# Patient Record
Sex: FEMALE
[De-identification: names, ages, dates, MRNs, and addresses within clinical notes are randomized per-mention and may not be internally consistent; named-entity substitution may affect disease eponyms.]

---

## 2019-03-13 ENCOUNTER — HOSPITAL ENCOUNTER (EMERGENCY)
Dept: HOSPITAL 88 - ER | Age: 49
Discharge: HOME | End: 2019-03-13
Payer: MEDICARE

## 2019-03-13 VITALS — HEIGHT: 62 IN | BODY MASS INDEX: 42.14 KG/M2 | WEIGHT: 229 LBS

## 2019-03-13 DIAGNOSIS — I10: ICD-10-CM
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
## 2019-03-13 DIAGNOSIS — R10.33: Primary | ICD-10-CM

## 2019-03-13 DIAGNOSIS — F41.9: ICD-10-CM

## 2019-03-13 DIAGNOSIS — Z87.11: ICD-10-CM

## 2019-03-13 DIAGNOSIS — K21.9: ICD-10-CM

## 2019-03-13 DIAGNOSIS — E78.5: ICD-10-CM

## 2019-03-13 LAB
ALBUMIN SERPL-MCNC: 4.1 G/DL (ref 3.5–5)
ALBUMIN/GLOB SERPL: 1.3 {RATIO} (ref 0.8–2)
ALP SERPL-CCNC: 122 IU/L (ref 40–150)
ALT SERPL-CCNC: 31 IU/L (ref 0–55)
AMYLASE SERPL-CCNC: 38 U/L (ref 25–125)
ANION GAP SERPL CALC-SCNC: 11.3 MMOL/L (ref 8–16)
BACTERIA URNS QL MICRO: (no result) /HPF
BASOPHILS # BLD AUTO: 0.1 10*3/UL (ref 0–0.1)
BASOPHILS NFR BLD AUTO: 0.8 % (ref 0–1)
BILIRUB UR QL: NEGATIVE
BUN SERPL-MCNC: 16 MG/DL (ref 7–26)
BUN/CREAT SERPL: 19 (ref 6–25)
CALCIUM SERPL-MCNC: 9.5 MG/DL (ref 8.4–10.2)
CHLORIDE SERPL-SCNC: 94 MMOL/L (ref 98–107)
CLARITY UR: CLEAR
CO2 SERPL-SCNC: 34 MMOL/L (ref 22–29)
COLOR UR: YELLOW
DEPRECATED NEUTROPHILS # BLD AUTO: 3.1 10*3/UL (ref 2.1–6.9)
DEPRECATED RBC URNS MANUAL-ACNC: (no result) /HPF (ref 0–5)
EGFRCR SERPLBLD CKD-EPI 2021: > 60 ML/MIN (ref 60–?)
EOSINOPHIL # BLD AUTO: 0.2 10*3/UL (ref 0–0.4)
EOSINOPHIL NFR BLD AUTO: 2.8 % (ref 0–6)
EPI CELLS URNS QL MICRO: (no result) /LPF
ERYTHROCYTE [DISTWIDTH] IN CORD BLOOD: 14 % (ref 11.7–14.4)
GLOBULIN PLAS-MCNC: 3.2 G/DL (ref 2.3–3.5)
GLUCOSE SERPLBLD-MCNC: 104 MG/DL (ref 74–118)
HCT VFR BLD AUTO: 38.5 % (ref 34.2–44.1)
HGB BLD-MCNC: 12.6 G/DL (ref 12–16)
KETONES UR QL STRIP.AUTO: NEGATIVE
LEUKOCYTE ESTERASE UR QL STRIP.AUTO: NEGATIVE
LIPASE SERPL-CCNC: 11 U/L (ref 8–78)
LYMPHOCYTES # BLD: 2.7 10*3/UL (ref 1–3.2)
LYMPHOCYTES NFR BLD AUTO: 41.2 % (ref 18–39.1)
MAGNESIUM SERPL-MCNC: 2 MG/DL (ref 1.3–2.1)
MCH RBC QN AUTO: 29.1 PG (ref 28–32)
MCHC RBC AUTO-ENTMCNC: 32.7 G/DL (ref 31–35)
MCV RBC AUTO: 88.9 FL (ref 81–99)
MONOCYTES # BLD AUTO: 0.5 10*3/UL (ref 0.2–0.8)
MONOCYTES NFR BLD AUTO: 7.7 % (ref 4.4–11.3)
NEUTS SEG NFR BLD AUTO: 47.2 % (ref 38.7–80)
NITRITE UR QL STRIP.AUTO: NEGATIVE
NON-SQ EPI CELLS URNS QL MICRO: (no result)
PLATELET # BLD AUTO: 317 X10E3/UL (ref 140–360)
POTASSIUM SERPL-SCNC: 3.3 MMOL/L (ref 3.5–5.1)
PROT UR QL STRIP.AUTO: NEGATIVE
RBC # BLD AUTO: 4.33 X10E6/UL (ref 3.6–5.1)
SODIUM SERPL-SCNC: 136 MMOL/L (ref 136–145)
SP GR UR STRIP: 1.01 (ref 1.01–1.02)
TSH SERPL DL<=0.005 MIU/L-ACNC: 1.07 UIU/ML (ref 0.35–4.94)
UROBILINOGEN UR STRIP-MCNC: 0.2 MG/DL (ref 0.2–1)
WBC #/AREA URNS HPF: (no result) /HPF (ref 0–5)

## 2019-03-13 PROCEDURE — 81001 URINALYSIS AUTO W/SCOPE: CPT

## 2019-03-13 PROCEDURE — 99284 EMERGENCY DEPT VISIT MOD MDM: CPT

## 2019-03-13 PROCEDURE — 83735 ASSAY OF MAGNESIUM: CPT

## 2019-03-13 PROCEDURE — 85025 COMPLETE CBC W/AUTO DIFF WBC: CPT

## 2019-03-13 PROCEDURE — 36415 COLL VENOUS BLD VENIPUNCTURE: CPT

## 2019-03-13 PROCEDURE — 80053 COMPREHEN METABOLIC PANEL: CPT

## 2019-03-13 PROCEDURE — 84443 ASSAY THYROID STIM HORMONE: CPT

## 2019-03-13 PROCEDURE — 83690 ASSAY OF LIPASE: CPT

## 2019-03-13 PROCEDURE — 74177 CT ABD & PELVIS W/CONTRAST: CPT

## 2019-03-13 PROCEDURE — 82150 ASSAY OF AMYLASE: CPT

## 2019-03-13 NOTE — DIAGNOSTIC IMAGING REPORT
EXAM: CT Abdomen and Pelvis WITH contrast  

INDICATION: Periumbilical and right groin pain

COMPARISON: CT abdomen/pelvis, 12/1/2018

TECHNIQUE: Abdomen and pelvis were scanned utilizing a multidetector helical

scanner from the lung base to the pubic symphysis after administration of IV

contrast. Coronal and sagittal reformations were obtained. Routine protocol was

performed. Scan was performed when during portal venous phase.



Dose modulation, iterative reconstruction, and/or weight based adjustment of

the mA/kV was utilized to reduce the radiation dose to as low as reasonably

achievable. 



            IV CONTRAST: 100 mL of Isovue-370

            ORAL CONTRAST: 450 cc water

            RADIATION DOSE: Total DLP: 499.18 mGy*cm

             Estimated effective dose: (DLP x 0.015 x size factor) mSv

            COMPLICATIONS: None



FINDINGS:



LINES and TUBES: There is an implanted electronic device in the right buttock

with electrodes extending into the spinal canal, ascending into the thoracic

area.



LOWER THORAX:  Mild atelectasis at the lung bases. Heart size normal.



HEPATOBILIARY:      No focal hepatic lesions. Dilated common duct, stable from

last exam, likely related to postcholecystectomy reservoir effect. 



GALLBLADDER: Surgical absence of the gallbladder with cholecystectomy clips.   

 



SPLEEN: No splenomegaly. 



PANCREAS: No focal masses or ductal dilatation.  



ADRENALS: No adrenal nodules    



KIDNEYS/URETERS: Kidneys enhance symmetrically.  No hydronephrosis. No cystic

or solid mass lesions.  No stones.



GI TRACT: No abnormal distention, wall thickening, or evidence of bowel

obstruction.  There are surgical changes of the fundus and body of the stomach.

Small hiatus hernia. Appendix is surgically absent according to history



PELVIC ORGANS/BLADDER: Urinary bladder appears unremarkable. The uterus is

surgically absent by history. No discrete abnormal mass or fluid collection in

the pelvis.



LYMPH NODES: No dominant lymph node mass is seen in the abdomen,

retroperitoneum or pelvis.



VESSELS: Abdominal aorta, IVC and portal system appear unremarkable.



PERITONEUM / RETROPERITONEUM: No pneumoperitoneum or ascites.



BONES: No acute or suspicious bony lesions. There are postoperative changes

with hardware in the lower lumbar spine.



SOFT TISSUES: Superficial surrounding soft tissue unremarkable. Mild

subcutaneous stranding in the region of the umbilicus likely related to

previous surgery.            



IMPRESSION: 

1.  No CT evidence for acute abdominal or pelvic pathology.



2.  The marked small bowel dilatation noted on previous exam has resolved.

Fluid content small bowel loops is likely related to water contrast for exam.









Staff: [facility]



Signed by: Dr. Bacilio Menjivar M.D. on 3/13/2019 4:51 PM

## 2019-03-13 NOTE — XMS REPORT
Continuity of Care Document

                             Created on: 2019



ALEJANDRO JACKSON

External Reference #: [id number]

: 1970

Sex: Female



Demographics







                          Address                   [address] [place]  [address]

 

                          Home Phone                [phone]

 

                          Preferred Language        Unknown

 

                          Marital Status            Unknown

 

                          Anglican Affiliation     Unknown

 

                          Race                      Unknown

 

                          Ethnic Group              Unknown





Author







                          Author                    [facility]              Interface

 

                          Address                   Unknown

 

                          Phone                     Unavailable



                                                    



Problems

                    





                    Problem                            Status                            Onset Date     

                          Classification                            Date Reported       

                          Comments                            Source                    

 

                          CT LUMBAR SPINE WO CONTRAST DX: RADICULO                            Active        

                    2019                                                         

                                                       [facility]                  

  

 

                    DX: M54.16                            Active                            10/10/2018  

                                                                                       

                                        [facility]                    

 

                          DX: RT MASS **CALLBACKS**                            Active                       

                    2018                                                                        

                                                      [facility]                    

 

                          ROUTINE SCREENING **LAST MMG W/**                            Active             

                    2018                                                              

                                                      [facility]                    

 

                          Discharge Diagnosis: Acute bronchitis                                             

                    2017

                                                        [facility]                 

   

 

                    NAUSEA/VOMITING                            Active                            2017

                                                                                       

                                        [facility]                    

 

                    UNK                            Active                            2017         

                                                                                        

                                        [facility]                    

 

                    DX: 6 MONTH FOLLOWUP                            Active                            2017

                                                                                       

                                        [facility]                    

 

                          LYNNE DELVALLE #[id number] FAX#[phone]                            Active        

                    2016                                                         

                                                       [facility]                  

  

 

                          793.80 ***LYNNE AGUIRRE PHONE#[phone]                            Active        

                    2015                                                         

                                                       [facility]                  

  

 

                          .4 724.4 / CPT 99248  21515                             Active        

                    10/13/2014                                                         

                                                       [facility]                  

  

 

                          793.80 ABNORMAL FINDING ON MAMMOGRAPHY                            Active          

                    2014                                                           

                                                      [facility]                    



 

                          V76.11 - SCREEN MAMMOGRA                            Active                        

                    2014                                                                         

                                                       [facility]                    

 

                          715.09 - GENERAL OSTEOAR                            Active                        

                    2013                                                                         

                                                       [facility]                    

 

                    721.3                            Active                            2013       

                                                                                       

                                        [facility]                    

 

                          .3 / CPT 61831 22836 21595                            Active               

                    2012                                                                

                                                      [facility]                    

 

                          INJECTION EPIDURAL STEROID BLOCK                            Active                

                    10/19/2011                                                                 

                                                      [facility]                    

 

                    Anxiety                            Active                                           

                    Problem                            2017                             

                                        [facility] [place]                    

 

                    Asthma                            Active                                            

                    Problem                            2017                              

                                        [facility] [place]                    

 

                    Back pain                            Active                                         

                          Problem                            2017                         

                                                      [facility] [place]                    

 

                    Depression                            Active                                        

                          Problem                            2017                        

                                                      [facility] [place]                    

 

                    Reflux                            Active                                            

                    Problem                            2017                              

                                        [facility] [place]                    

 

                          Short of breath on exertion                            Resolved                   

                                                Problem                            2017     

                                                      [facility] [place]     

               

 

                    cough                            Active                                             

                    Problem                            2014                               

                                        [facility]                    

 

                          Depression annual review                            Resolved                      

                                                Problem                            2014        

                                                      [facility]                    

 

                    seizures                            Active                                          

                    Problem                            2014                            

                                        [facility]                    

 

                    Anxiety                            Resolved                                         

                          Problem                            2013                         

                                                       [facility]                    

 

                    Asthma                            Resolved                                          

                    Problem                            2013                            

                                         [facility]                    

 

                    Back pain                            Resolved                                       

                          Problem                            2013                       

                                                       [facility]                    

 

                          Depression annual review                            Resolved                      

                                                Problem                            2013        

                                                      [facility]      

              

 

                    Reflux                            Resolved                                          

                    Problem                            2013                            

                                         [facility]                    

 

                    724.4                            Active                                             

                                                                                                

                                        [facility]                    

 

                          OTH ABN AND INCONCLUSIVE FINDINGS ON DX                             Active        

                                                                                       

                                                      [facility]                    

 

                          ENCNTR FOR F/U EXAM AFT TRTMT FOR COND O                            Active        

                                                                                       

                                                      [facility]                    

 

                          RADICULOPATHY, LUMBAR REGION                            Active                    

                                                                                                   

                                                      [facility]                    

 

                          RADICULOPATHY, LUMBAR REGION                            Active                    

                                                                                                   

                                                      [facility]                    



                                                                                
                                                                                
                                                                                
                                                                                
                                                                                
                                                                                
                                                                                
                                                                                




Medications

                    





                    Medication                            Details                            Route      

                          Status                            Patient Instructions         

                          Ordering Provider                            Order Date           

                                        Source                    

 

                          predniSONE 50 mg oral tablet                            50 mg=1 tab, PO, Daily, X 

5 day, # 5 tab, 0 Refill(s)                                                        Active

                                                                                    2017

                                        [facility]                    

 

                          azithromycin 250 mg oral tablet                            See Instructions, Take 

2 tablets by mouth the first day then 1 tablet by mouth daily on days 2-5., X 5 
day, # 6 tab, 0 Refill(s)                                                        Active

                                                                                    2017

                                        [facility]                    

 

                          NS (Bolus) IV                            1,000 mL, 1,000 ml/hr, Infuse Over: 1 hr,

 Route: IV, ONCE, Priority: STAT, Dosing Weight 109.091 kg, Start date: 17
10:52:00 CST, Duration: 1 doses or times, Stop date: 17 10:52:00 CST      
                                                 Inactive                              

                                                      2017                     

                                        [facility]                    

 

                          methylPREDNISolone SODium SUCCinate                            125 mg, 2 mL, Route:

 IVP, Drug form: INJ, ONCE, Dosing Weight 109.091, kg, Priority: STAT, Start 
date: 17 10:06:00 CST, Stop date: 17 10:06:00 CSTNotes: (Same 
as:Solu-MEDROL, A-Methapred)                                                        Inactive

                                                                                    2017

                                        [facility]                    

 

                          albuterol-ipratropium 2.5-0.5 mg inhalation solution                            3 

mL, Route: NEB, Drug Form: SOLN, Dosing Weight 109.091, kg, ONCE, STAT, Start 
date: 17 10:06:00 CST, Stop date: 17 10:06:00 CST                   
                                                Inactive                                           

                                                2017                            [facility]

                    

 

                          Sodium Chloride 0.154 MEQ/ML Injectable Solution                            1,000 

mL, Rate: 25 ml/hr, Infuse over: 40 hr, Route: IV, Dosing Weight 104.176 kg, 
Total Volume: 1,000, Start date: 05/15/17 7:02:00 CDT, Duration: 30 day, Stop 
date: 17 7:01:00 CDT                                                        Inactive

                                                                                    05/15/2017

                                        [facility]                    

 

                    Singulair                            Daily, 0 Refill(s)                             

                           Active                                                    

                                                2017                            [facility]     

               

 

                          Dicyclomine                            20 mg, 0 Refill(s)                         

                                                Active                                                  

                                                2017                            [facility]   

                 

 

                          omeprazole 40 mg oral delayed release capsule                            40 mg=1 cap,

 PO, Daily, # 30 cap, 0 Refill(s)                                                  

                    Active                                                                           

                          2017                            [facility]                    

 

                          sertraline 50 mg oral tablet                            50 mg=1 tab, PO, Daily, # 

30 tab, 0 Refill(s)                                                        Active    

                                                                                2017

                                        [facility]                    

 

                          Phentermine Hydrochloride 37.5 MG Oral Tablet                            37.5 mg=1

 tab, PO, Daily, # 30 tab, 0 Refill(s)                                             

                    Active                                                                      

                          2017                            [facility]                    

 

                                        Acetaminophen 325 MG / Oxycodone Hydrochloride 10 MG Oral Tablet                

                          2 tab, PO, Q6H, PRN for pain, 0 Refill(s)                                

                        Active                                                         

                           2017                            [facility]        

            

 

                          naloxone 1 mg/mL injectable solution                            IV, ONCE, 0 Refill(s)

                                                        Active                       

                                                                            2017                

                                        [facility]                    

 

                          naloxegol 25 MG Oral Tablet [Movantik]                            25 mg=1 tab, PO,

 QAM, 0 Refill(s)                                                        Active      

                                                                              2017

                                        [facility]                    

 

                          Furosemide 40 MG Oral Tablet                            40 mg=1 tab, PO, Daily, # 

30 tab, 0 Refill(s)                                                        Active    

                                                                                2017

                                        [facility]                    

 

                          baclofen 20 mg oral tablet                            20 mg=1 tab, PO, TID, # 270 

tab, 0 Refill(s)                                                        Active       

                                                                             2017

                                        [facility]                    

 

                          Oxycodone Hydrochloride 5 MG Oral Tablet                            10 mg, Route: 

PO, Drug form: TAB, ONCE, Dosing Weight 90.909, kg, PRN as needed for pain, 
Start date: 10/31/14 13:32:00                                                        

Inactive                                                                             

                          10/31/2014                            [facility]                    

 

                          Promethazine                            6.25 mg, Route: IVPB, ONCE, Dosing Weight 

90.909, kg, PRN Nausea & Vomiting, Start date: 10/31/14 12:55:00                
                                                Inactive                                       

                                                10/31/2014                            [facility]

                    

 

                          Ondansetron                            4 mg, Route: IVP, ONCE, Dosing Weight 90.909,

 kg, PRN Nausea & Vomiting, Start date: 10/31/14 12:55:00                       
                                                Inactive                                              

                                                10/31/2014                            [facility]

                    

 

                          Diphenhydramine                            12.5 mg, Route: IVP, Drug form: INJ, Q6H,

 Dosing Weight 90.909, kg, PRN Itching, Start date: 10/31/14 12:55:00, Duration:
 30 day, Stop date: 14 12:54:00                                              

                    Inactive                                                                     

                          10/31/2014                            [facility]                    



 

                          Naloxone                            0.04 mg, Route: IVP, Q2MIN, Dosing Weight 90.909,

 kg, PRN Narcotic Reversal, Start date: 10/31/14 12:55:00, Duration: 8 doses or 
times, Stop date: Limited # of times                                               

                    Inactive                                                                      

                          10/31/2014                            [facility]                    

 

                          Flumazenil                            0.2 mg, Route: IVP, PRN, Dosing Weight 90.909,

 kg, PRN Benzodiazepine Reversal, Initial dose, Start date: 10/31/14 12:55:00, 
Duration: 30 day, Stop date: 14 11:54:00                                     

                    Inactive                                                            

                          10/31/2014                            [facility]           

         

 

                          Morphine                            4 mg, Route: IVP, Q5Min, Dosing Weight 90.909,

 kg, PRN Pain Score 7-10, Start date: 10/31/14 12:55:00, Duration: 3 doses or 
times, Stop date: Limited # of times                                               

                    Inactive                                                                      

                          10/31/2014                            [facility]                    

 

                          Meperidine                            12.5 mg, Route: IVP, Q30Min, Dosing Weight 90.909,

 kg, PRN Other -See Comment, For shivering, Start date: 10/31/14 12:55:00, 
Duration: 2 doses or times, Stop date: Limited # of times                       
                                                Inactive                                              

                                                10/31/2014                            [facility]

                    

 

                          Hydromorphone                            0.5 mg, Route: IVP, Q5Min, Dosing Weight 

90.909, kg, PRN Pain Score 7-10, Start date: 10/31/14 12:55:00, Duration: 4 
doses or times, Stop date: Limited # of times                                      

                    Inactive                                                             

                          10/31/2014                            [facility]            

        

 

                          Oxycodone Hydrochloride 1 MG/ML Oral Solution                            5 mg, Route:

 NG, Drug form: LIQ, Q4H, Dosing Weight 90.909, kg, PRN Pain Score 4-6, Start 
date: 10/31/14 12:55:00, Duration: 30 day, Stop date: 14 12:54:00         
                                                Inactive                                

                                                      10/31/2014                       

                                        [facility]                    

 

                          Acetaminophen                            1,000 mg, Route: IVPB, Drug form: INJ, ONCE,

 Dosing Weight 90.909, kg, PRN Pain Score 1-3, Start date: 10/31/14 12:55:00, 
Duration: 1 doses or times, Stop date: Limited # of times                       
                                                Inactive                                              

                                                10/31/2014                            [facility]

                    

 

                          Oxycodone                            5 mg, Route: PO, Drug form: TAB, Q4H, Dosing 

Weight 90.909, kg, PRN Pain Score 4-6, Start date: 10/31/14 12:55:00, Duration: 
30 day, Stop date: 14 12:54:00                                               

                    Inactive                                                                      

                          10/31/2014                            [facility]                    

 

                          Fentanyl                            25 microgram, Route: IVP, Q5Min, Dosing Weight

 90.909, kg, PRN Pain Score 4-6, Start date: 10/31/14 12:55:00, Duration: 4 
doses or times, Stop date: Limited # of times                                      

                    Inactive                                                             

                          10/31/2014                            [facility]            

        

 

                                        Calcium Chloride 0.0014 MEQ/ML / Potassium Chloride 0.004 MEQ/ML / Sodium Chloride

 0.103 MEQ/ML / Sodium Lactate 0.028 MEQ/ML Injectable Solution                 
                                        1,000 mL, Rate: 125 ml/hr, Infuse over: 8 hr, Route: IV, Dosing Weight 90.909

 kg, Total Volume: 1,000, Start date: 10/31/14 12:55:00, Duration: 30 day, Stop 
date: 14 12:54:00                                                        Inactive

                                                                                    10/31/2014

                                        [facility]                    

 

                          Clindamycin                            600 mg, Route: IVPB, ONCE, Dosing Weight 90.909,

 kg, Start date: 10/31/14 10:42:00, Stop date: 10/31/14 10:42:00                
                                                Inactive                                       

                                                10/31/2014                            [facility]

                    

 

                                        Calcium Chloride 0.0014 MEQ/ML / Potassium Chloride 0.004 MEQ/ML / Sodium Chloride

 0.103 MEQ/ML / Sodium Lactate 0.028 MEQ/ML Injectable Solution                 
                                        1,000 mL, Rate: 25 ml/hr, Infuse over: 40 hr, Route: IV, Dosing Weight 90.909

 kg, Total Volume: 1,000, Start date: 10/31/14 10:38:00, Duration: 30 day, Stop 
date: 14 10:37:00                                                        Inactive

                                                                                    10/31/2014

                                        [facility]                    

 

                          gabapentin 300 MG Oral Capsule [Neurontin]                            300 mg=1 cap,

 PO, TID, 0 Refill(s)                                                        Active  

                                                                                  10/29/2014

                                        [facility]                    

 

                                        Cyclobenzaprine hydrochloride 10 MG Oral Tablet [Flexeril]                      

                          10 mg=1 tab, PO, TID, 0 Refill(s)                                              

                    Active                                                                       

                          10/29/2014                            [facility]                    

 

                          meloxicam 15 MG Oral Tablet [Mobic]                            15 mg=1 tab, PO, Daily,

 0 Refill(s)                                                        Active           

                                                                            10/29/2014    

                                        [facility]                    

 

                          gabapentin                            300 mg, PO, BID, 0 Refill(s)                

                                                Active                                         

                                                10/29/2014                            [facility]

                    

 

                          Ondansetron 4 MG Oral Tablet [Zofran]                            4 mg=1 tab, PO, Q8H,

 as needed for nausea/vomiting, 0 Refill(s)                                        

                    Active                                                                 

                          10/29/2014                            [facility]                

    

 

                          Clonazepam                            0.5 mg, PO, 0 Refill(s)                     

                                                Active                                              

                                                10/29/2014                            [facility]

                    

 

                          Hydrochlorothiazide                            12.5 mg, PO, 0 Refill(s)           

                                                Active                                    

                                                10/29/2014                            [facility]                    

 

                          cetirizine hydrochloride 10 MG Oral Tablet [Zyrtec]                            10 

mg=1 tab, PO, Daily, 0 Refill(s)                                                   

                    Active                                                                            

                          10/29/2014                            [facility]                    

 

                          quetiapine 25 MG Oral Tablet [Seroquel]                            25 mg=1 tab, PO,

 TID, 0 Refill(s)                                                        Active      

                                                                              10/29/2014

                                        [facility]                    

 

                          valsartan 160 MG Oral Tablet [Diovan]                            160 mg=1 tab, PO,

 Daily, 0 Refill(s)                                                        Active    

                                                                                10/29/2014

                                        [facility]                    

 

                          sertraline 100 mg oral tablet                            100 mg=1 tab, PO, Daily, 

0 Refill(s)                                                        Active            

                                                                            10/29/2014     

                                        [facility]                    

 

                          flumazenil                            0.2 mg, 2 mL, Route: IVP, Drug form: INJ, PRN,

 Dosing Weight 90.909, kg, PRN Benzodiazepine Reversal, Initial dose, Start 
date: 13 8:30:00, Duration: 30 day, Stop date: 13 8:29:00           
                          IVP                            No Longer Active                     

                                                [facility]                            2013         

                                        [facility]                    

 

                          naloxone                            0.04 mg, 0.04 mL, Route: IVP, Drug form: INJ, 

Q2MIN, Dosing Weight 90.909, kg, PRN Narcotic Reversal, Start date: 13 
8:30:00, Duration: 8 doses or times, Stop date: Limited # of times              
                          IVP                            No Longer Active                        

                                                [facility]                            2013            

                                        [facility]                    

 

                          diphenhydrAMINE                            12.5 mg, 0.25 mL, Route: IVP, Drug form:

 INJ, PRN, Dosing Weight 90.909, kg, PRN Itching, Start date: 13 8:30:00, 
Duration: 30 day, Stop date: 13 8:29:00                            IVP       

                          No Longer Active                                                

                    [facility]                            2013                            [facility]

                    

 

                          ondansetron                            4 mg, 2 mL, Route: IVP, Drug form: INJ, ONCE,

 Dosing Weight 90.909, kg, PRN Nausea & Vomiting, Start date: 13 8:30:00  
                          IVP                            No Longer Active            

                                                [facility]                            2013

                                        [facility]                    

 

                          dexamethasone                            4 mg, 1 mL, Route: IVP, Drug form: INJ, ONCE,

 Dosing Weight 90.909, kg, PRN Nausea & Vomiting, Start date: 13 8:30:00  
                          IVP                            No Longer Active            

                                                [facility]                            2013

                                        [facility]                    

 

                          labetalol                            5 mg, 1 mL, Route: IVP, Drug form: INJ, Q5Min,

 Dosing Weight 90.909, kg, PRN Elevated BP, Start date: 13 8:30:00, 
Duration: 5 doses or times, Stop date: Limited # of times                       
                    IVP                            No Longer Active                                   

                          [facility]                            2013                     

                                        [facility]                    

 

                          niCARdipine                            0.25 mg, 0.1 mL, Route: IVP, Drug form: INJ,

 Q5Min, Dosing Weight 90.909, kg, PRN Elevated BP, Start date: 13 8:30:00,
 Duration: 4 doses or times, Stop date: Limited # of times                      
                    IVP                            No Longer Active                                  

                          [facility]                            2013                    

                                        [facility]                    

 

                          metoprolol                            1 mg, 1 mL, Route: IVP, Drug form: INJ, Q5Min,

 Dosing Weight 90.909, kg, PRN Elevated BP, Start date: 13 8:30:00, 
Duration: 5 doses or times, Stop date: Limited # of times                       
                    IVP                            No Longer Active                                   

                          [facility]                            2013                     

                                        [facility]                    

 

                          hydrALAZINE                            5 mg, 0.25 mL, Route: IVP, Drug form: INJ, 

Q5Min, Dosing Weight 90.909, kg, PRN Elevated BP, Start date: 13 8:30:00, 
Duration: 4 doses or times, Stop date: Limited # of times                       
                    IVP                            No Longer Active                                   

                          [facility]                            2013                     

                                        [facility]                    

 

                          morphine Sulfate                            2 mg, 1 mL, Route: IVP, Drug form: INJ,

 Q5Min, Dosing Weight 90.909, kg, PRN Pain Score 4-6, Start date: 13 
8:30:00, Duration: 8 doses or times, Stop date: Limited # of times              
                          IVP                            No Longer Active                        

                                                [facility]                            2013            

                                        [facility]                    

 

                          meperidine                            12.5 mg, 0.25 mL, Route: IVP, Drug form: INJ,

 Q30Min, Dosing Weight 90.909, kg, PRN Other -See Comment, For shivering, Start 
date: 13 8:30:00, Duration: 2 doses or times, Stop date: Limited # of 
times                            IVP                            No Longer Active     

                                                   [facility]                            2013

                                        [facility]                    

 

                          hydromorphone                            0.5 mg, 0.5 mL, Route: IVP, Drug form: SOLN,

 Q5Min, Dosing Weight 90.909, kg, PRN Pain Score 4-6, Start date: 13 
8:30:00, Duration: 5 doses or times, Stop date: Limited # of times              
                          IVP                            No Longer Active                        

                                                [facility]                            2013            

                                        [facility]                    

 

                          fentanyl                            25 microgram, 0.5 mL, Route: IVP, Drug form: INJ,

 Q5Min, Dosing Weight 90.909, kg, PRN Pain Score 4-6, Start date: 13 
8:30:00, Duration: 4 doses or times, Stop date: Limited # of times              
                          IVP                            No Longer Active                        

                                                [facility]                            2013            

                                        [facility]                    

 

                          Lactated Ringers Injection IV 1,000 mL                            1,000 mL, Rate: 

25 ml/hr, Infuse over: 40 hr, Route: IV, kg, Total Volume: 1,000, Start date: 
13 6:38:00, Duration: 30 day, Stop date: 13 6:37:00                 
                    IV                            No Longer Active                              

                          [facility]                            2013               

                                        [facility]                    

 

                          morphine 15 mg oral capsule                            15 mg, 1 cap, PO, BID, PRN,

 Pain, Substitution Allowed, CAP                            PO                     

                    Active                                                                          

                          2013                            [facility]                    

 

                          tizanidine 4 mg oral tablet                            8 mg, 2 tab, PO, TID, 180 tab,

 Substitution Allowed, TAB                            PO                            Active

                                                                                    2013

                                        [facility]                    

 

                          flumazenil                            0.2 mg, 2 mL, Route: IVP, Drug form: INJ, PRN,

 Dosing Weight 104.545, kg, PRN Benzodiazepine Reversal, Initial dose, Start 
date: 12 8:51:00, Duration: 5 doses or times, Stop date: Limited # of 
times                            IVP                            No Longer Active     

                                                   [facility]                            2012

                                        [facility]                    

 

                          naloxone                            0.04 mg, 0.1 mL, Route: IVP, Drug form: INJ, Q2MIN,

 Dosing Weight 104.545, kg, PRN Narcotic Reversal, Start date: 12 8:51:00,
 Duration: 8 doses or times, Stop date: Limited # of times                      
                    IVP                            No Longer Active                                  

                          [facility]                            2012                   

                                        [facility]                    

 

                          fentanyl                            25 microgram, Route: IVP, Q5Min, Dosing Weight

 104.545, kg, PRN Pain Score 4-6, Start date: 12 8:51:00, Duration: 4 
doses or times, Stop date: Limited # of times                            IVP       

                          No Longer Active                                                

                    [facility]                            2012                            [facility]

                    

 

                          morphine Sulfate                            2 mg, 1 mL, Route: IVP, Drug form: INJ,

 Q5Min, Dosing Weight 104.545, kg, PRN Pain Score 4-6, Start date: 12 
8:51:00, Duration: 8 doses or times, Stop date: Limited # of times              
                          IVP                            No Longer Active                        

                                                [facility]                            2012           

                                        [facility]                    

 

                          hydromorphone                            0.5 mg, 0.5 mL, Route: IVP, Drug form: SOLN,

 Q5Min, Dosing Weight 104.545, kg, PRN Pain Score 4-6, Start date: 12 
8:51:00, Duration: 5 doses or times, Stop date: Limited # of times              
                          IVP                            No Longer Active                        

                                                [facility]                            2012           

                                        [facility]                    

 

                          acetaminophen-hydrocodone 325 mg-5 mg oral tablet                            2 tab,

 Route: PO, Drug Form: TAB, Dosing Weight 104.545, kg, Q4H, PRN Pain Score 4-6, 
Start date: 12 8:51:00, Duration: 30 day, Stop date: 10/12/12 8:50:00     
                          PO                            No Longer Active                

                                                [facility]                            2012   

                                        [facility]                    

 

                          ondansetron                            4 mg, Route: IVP, ONCE, Dosing Weight 104.545,

 kg, PRN Nausea & Vomiting, Start date: 12 8:51:00                        
                    IVP                            No Longer Active                                    

                          [facility]                            2012                     

                                        [facility]                    

 

                          Lactated Ringers Injection IV 1,000 mL                            1,000 mL, Rate: 

25 ml/hr, Infuse over: 40 hr, Route: IV, kg, Total Volume: 1,000, Start date: 
12 8:12:00, Duration: 30 day, Stop date: 10/12/12 8:11:00                 
                    IV                            No Longer Active                              

                          [facility]                            2012                  

                                        [facility]                    

 

                          flumazenil                            0.2 mg, 2 mL, Route: IVP, Drug form: INJ, PRN,

 PRN Benzodiazepine Reversal, Initial dose, Start date: 12 8:37:00, 
Duration: 30 day, Stop date: 12 8:36:00                            IVP       

                          No Longer Active                                                

                    [facility]                            2012                            [facility]

                    

 

                          acetaminophen-hydrocodone 325 mg-5 mg oral tablet                            1 tab,

 Route: PO, Drug Form: TAB, Q4H, PRN Pain Score 1-3, Start date: 12 
8:37:00, Duration: 30 day, Stop date: 12 8:36:00                            PO

                            No Longer Active                                         

                          [facility]                            2012                         

                                        [facility]                    

 

                          ondansetron                            4 mg, 2 mL, Route: IVP, Drug form: INJ, ONCE,

 PRN Nausea & Vomiting, Start date: 12 8:37:00                            IVP

                            No Longer Active                                         

                          [facility]                            2012                         

                                        [facility]                    

 

                          naloxone                            0.04 mg, 0.04 mL, Route: IVP, Drug form: INJ, 

Q2MIN, PRN Narcotic Reversal, Start date: 12 8:37:00, Duration: 8 doses or
 times, Stop date: Limited # of times                            IVP               

                    No Longer Active                                                        [facility]

                            2012                            [facility]       

             

 

                          hydromorphone                            0.5 mg, 0.25 mL, Route: IVP, Drug form: INJ,

 Q5Min, PRN Pain Score 4-6, Start date: 12 8:37:00, Duration: 5 doses or 
times, Stop date: Limited # of times                            IVP                

                    No Longer Active                                                        [facility]

                            2012                            [facility]       

             

 

                          fentanyl                            25 microgram, 0.5 mL, Route: IVP, Drug form: INJ,

 Q5Min, PRN Pain Score 4-6, Start date: 12 8:37:00, Duration: 4 doses or 
times, Stop date: Limited # of times                            IVP                

                    No Longer Active                                                        [facility]

                            2012                            [facility]       

             

 

                          Lactated Ringers Injection IV 1,000 mL                            1,000 mL, Rate: 

25 ml/hr, Infuse over: 40 hr, Route: IV, Dosing Weight 104.545 kg, Total Volume:
 1,000, Start date: 12 6:53:00, Duration: 30 day, Stop date: 12 
6:52:00                            IV                            No Longer Active    

                                                    [facility]                            

2012                              [facility]                    

 

                                        multivitamin with minerals Multiple Vitamins with Zinc oral capsule             

                                        1 cap, PO, Daily, 60 cap, Substitution Allowed, Soft Stop, CAP      

                      PO                            Active                             

                                                       2012                    

                                        [facility]                    

 

                          Premarin 0.625 mg oral tablet                            0.625 mg, 1 tab, PO, Daily,

 30 tab, Substitution Allowed, TAB                            PO                   

                    Active                                                                        

                          2012                            [facility]                    

 

                          meloxicam 15 mg oral tablet                            15 mg, 1 tab, PO, Daily, 90

 tab, Substitution Allowed, TAB                            PO                      

                    Active                                                                           

                          2012                            [facility]                    

 

                          Epitol 200 mg oral tablet                            400 mg, 2 tab, PO, BID, 120 tab,

 Substitution Allowed, TAB                            PO                            Active

                                                                                    2012

                                        [facility]                    

 

                          Xopenex HFA 45 mcg/inh inhalation aerosol with adapter                            

2 puff, INHALATION, Q4H, PRN, as needed for wheezing, Substitution Allowed, Soft
 Stop                            INHALATION                            Active        

                                                                            2012 

                                        [facility]                    

 

                          Benadryl                            25 mg, Route: IVP, ONCE, PRN Itching, Start date:

 12 11:02:00                            IVP                            No Longer

 Active                                                        Courtney                

                          2012                            [facility]                    

 

                          Ventolin HFA                            INHALATION, Substitution Allowed, Maintenance

                            INHALATION                            Active             

                                                                            2012      

                                        [facility]                    

 

                          lidocaine 1%                            0.5 mL, Route: SUB-Q, Drug Form: INJ, ONCALL,

 Start date: 01/10/12 15:00:00, Duration: 1 doses or times                      
                    SUB-Q                            No Longer Active                                

                          Aldo                            01/10/2012                 

                                        [facility]                    

 

                          Lactated Ringers Injection IV 1,000 mL                            1,000 mL, Rate: 

25 ml/hr, Infuse over: 40 hr, Route: IV, Total Volume: 1,000, Start date: 
01/10/12 14:44:00, Duration: 30 day, Stop date: 12 14:43:00               
                    IV                            No Longer Active                            

                            Aldo                            01/10/2012             

                                        [facility]                    

 

                          ondansetron                            4 mg, 2 mL, Route: IVP, Drug form: INJ, ONCE,

 PRN Nausea & Vomiting, Start date: 10/26/11 8:37:00                            IVP

                            No Longer Active                                         

                    Reyes                            10/26/2011                            

[facility]                    

 

                          flumazenil                            0.2 mg, 2 mL, Route: IVP, Drug form: INJ, PRN,

 PRN Other -See Comment, Initial dose, Start date: 10/26/11 8:37:00, Duration: 
30 day, Stop date: 11 7:36:00                            IVP                 

                    No Longer Active                                                        [facility]

                            10/26/2011                            [facility]       

             

 

                          naloxone                            0.04 mg, 0.1 mL, Route: IVP, Drug form: INJ, Q2MIN,

 PRN Narcotic Reversal, Start date: 10/26/11 8:37:00, Duration: 8 doses or 
times, Stop date: Limited # of times                            IVP                

                    No Longer Active                                                        [facility]

                            10/26/2011                            [facility]       

             

 

                          meperidine                            12.5 mg, 0.25 mL, Route: IVP, Drug form: INJ,

 Q30Min, PRN Other -See Comment, For shivering, Start date: 10/26/11 8:37:00, 
Duration: 2 doses or times, Stop date: Limited # of times                       
                    IVP                            No Longer Active                                   

                          [facility]                            10/26/2011                    

                                        [facility]                    

 

                          hydromorphone                            0.5 mg, 0.5 mL, Route: IVP, Drug form: SOLN,

 Q5Min, PRN Pain Score 4-6, Start date: 10/26/11 8:37:00, Duration: 5 doses or 
times, Stop date: Limited # of times                            IVP                

                    No Longer Active                                                        [facility]

                            10/26/2011                            [facility]       

             

 

                          fentanyl                            25 microgram, 0.5 mL, Route: IVP, Drug form: INJ,

 Q5Min, PRN Pain Score 4-6, Start date: 10/26/11 8:37:00, Duration: 4 doses or 
times, Stop date: Limited # of times                            IVP                

                    No Longer Active                                                        [facility]

                            10/26/2011                            [facility]       

             

 

                          acetaminophen-hydrocodone 325 mg-5 mg oral tablet                            2 tab,

 Route: PO, Drug Form: TAB, Q4H, PRN Pain Score 4-6, Start date: 10/26/11 
8:37:00, Duration: 30 day, Stop date: 11 8:36:00                            PO

                            No Longer Active                                         

                    [facility]                            10/26/2011                            

[facility]                    

 

                          lidocaine 1%                            0.5 mL, Route: SUB-Q, Drug Form: INJ, ONCALL,

 Start date: 10/26/11 8:00:00, Duration: 1 doses or times                       
                    SUB-Q                            No Longer Active                                 

                          [facility]                            10/26/2011                  

                                        [facility]                    

 

                          Lactated Ringers Injection IV 1,000 mL                            1,000 mL, Rate: 

25 ml/hr, Infuse over: 40 hr, Route: IV, Total Volume: 1,000, Start date: 
10/26/11 7:33:00, Duration: 30 day, Stop date: 11 7:32:00                 
                    IV                            No Longer Active                              

                          [facility]                            10/26/2011               

                                        [facility]                    

 

                          clindamycin 150 mg oral capsule                            1 cap, PO, Q6H, 40 cap,

 Substitution Allowed, CAP                            PO                            Active

                                                                                    10/25/2011

                                        [facility]                    

 

                          Erythrocin Stearate Filmtab 250 mg oral tablet                            1 tab, PO,

 Q8H, 40 tab, Substitution Allowed, TAB                            PO              

                    Active                                                                   

                          10/25/2011                            [facility]                  

  

 

                          Nexium 40 mg oral delayed release capsule                            1 cap, PO, Daily,

 30 cap, Substitution Allowed                            PO                        

                    Active                                                                             

                          10/25/2011                            [facility]                    

 

                          Norco 10/325 oral tablet                            1 tab, PO, BID, PRN, 24 tab, for

 pain, Substitution Allowed, Maintenance                            PO             

                    Active                                                                  

                          10/25/2011                            [facility]                 

   



                                                                                
                                                                                
                                                                                
                                                                                
                                                                                
                                                                                
                                                                                
                                                                                
                                                                                
                                                                                
                                                                                
                                                                                
                                                                                
                                                                                
                                                                                
                                                                                
                                                                                
                                                                                
                                                                                
                                                                                
        



Allergies, Adverse Reactions, Alerts

                    





                    Substance                            Category                            Reaction   

                          Severity                            Reaction type           

                          Status                            Date Reported                     

                          Comments                            Source                    

 

                    aspirin                            Assertion                                        

                                                Drug allergy                            Active

                                                                                    [facility]                    

 

                    Bactrim                            Assertion                                        

                                                Drug allergy                            Active

                                                                                    [facility]                    

 

                    Latex                            Assertion                            Blisters      

                                                      Drug allergy                       

                    Active                                                                            

                                        [facility]                    

 

                    penicillin                            Assertion                                     

                                                Drug allergy                            

Active                                                                               

                                        [facility]                    

 

                    sulfa drugs                            Assertion                                    

                                                      Drug allergy                         

                    Active                                                                              

                                        [facility]                    

 

                    lovastatin                            Assertion                                     

                                                Drug allergy                            

Active                                                                               

                                        [facility]                    

 

                    methadone                            Assertion                                      

                                                Drug allergy                            Active

                                                                                    [facility]                    



                                                                                
                                        



Immunizations

                    





                    Immunization                            Date Given                            Site  

                          Status                            Last Updated             

                          Comments                            Source                    



                                                                        



Results

                    





                    Order Name                            Results                            Value      

                          Reference Range                            Date                

                          Interpretation                            Comments                       

                                        Source                    

 

                          Spine lumbar wo contrast CT                            Spine lumbar wo contrast CT

                                        Patient Name: ALEJANDRO JACKSON

: 1970; Age: 48 years  y/o Female

MR: [id number]



Study: Spine lumbar wo contrast CT 2019 10:14 AM CST

Ordering Physician: Horacio Wilson MD



Clinical Indication:  - M54.16   Radiculopathy, lumbar region;

Comparison: 10/15/2018



TECHNIQUE: Sequential trans-axial images were obtained with a multi-detector 
helical CT. Coronal and sagittal reconstructions were obtained.

CT Radiation Dose  mGy-cm



FINDINGS: 

The alignment is maintained. Again noted are L4-S1 anterior and posterior fusion
 changes. There is solid anterior and posterior osseous fusion. An anterior 
vertebral body screw is noted at L4 level. The alignment is maintained across 
fusion.



Epidural spinal stimulator leads enter the canal at T11-T12 level, tips not 
included on the CT.



The paravertebral soft tissues are unremarkable.



L5-S1: Posterior decompression. Posterolateral and anterior osseous fusion. No 
canal or foraminal stenosis. 

L4-L5: Anterior and posterior osseous fusion and posterior decompression. No 
recurrent stenosis.

L3-L4: Small diffuse disc bulge and mild facet arthrosis. No stenosis.

L2-L3, L1-L2 and T12-L1: Unremarkable.



Partially visualized are partial gastrectomy and cholecystectomy changes.



If there is further concern, CT myelogram or MRI of the lumbar spine may be 
performed for complete assessment.



IMPRESSION:

Stable postsurgical changes at L4-S1 levels. No recurrent stenosis.

 No stenosis at the other lumbar levels.







FLYNN: VIVIAN

                                                          2019                 

                                                      -

                                        -





Read by:  Jeanine Goreap

Dictated Date/time:  01/10/19 10:40

Electronically Signed by:  Jeanine Gore              01/10/19 
10:45

FINAL REPORT

                                        [facility]                    

 

                          Spine lumbar wo contrast CT                            Spine lumbar wo contrast CT

                                        Patient Name: ALEJANDRO JACKSON

: 1970; Age: 48 years Female

MR: [id number]



Study: Spine lumbar wo contrast CT 10/15/2018 11:29 AM CDT

Clinical Indication:  - M54.16   Radiculopathy, lumbar region worsening symptoms
 with weather changes. 



COMPARISON: Lumbar spine radiograph 01/10/2017



TECHNIQUE: Sequential trans-axial images were obtained with a multi-detector 
helical CT. Coronal and sagittal reconstructions were obtained.



CT imaging performed at this location utilizes radiation dose optimization 
techniques which include one or more of the following:

                                        -Automated exposure control

                                        -Adjustment of the mA and/or kV according to patient size

                                        -Use of iterative reconstruction technique

CT Radiation Dose  mGy-cm



FINDINGS: 

ALIGNMENT AND GENERAL ASSESSMENT: Anterior and interbody lumbar fusion spans the
 L4-L5 and L5-S1 levels. There is complete bony fusion spanning the L4-L5 and 
L5-S1 disc space. Alignment of lumbar spine is within normal limits. Mild to 
moderate degenerative changes of both sacroiliac joints are partially 
demonstrated. Partially demonstrated leads enter the spinal canal at the T12 
level posteriorly. These are present within the posterior aspect of the spinal 
canal. Limited evaluation of the paraspinous soft tissues is unremarkable.



DISC SPACES AND SOFT TISSUES: MRI has higher sensitivity and specificity for 
disc and soft tissue disease.



T12-L1: The disc is normal. There is no central stenosis. There is no foraminal 
stenosis. The facet joints appear normal.



L1-L2: The disc is normal. There is no central stenosis. There is no foraminal 
stenosis. The facet joints appear normal. 



L2-L3: No significant disc bulge protrusion evident. No spinal canal stenosis 
evident. There is no foraminal stenosis. The facet joints appear normal. 



L3-L4: Mild posterior disc bulge. Mild facet arthropathy. No foraminal narrowing
 evident. Evaluation limited due to lack intrathecal contrast. No significant 
spinal canal stenosis evident.



L4-L5: Anterior fusion.  Spinal canal is broad in the setting of posterior 
decompression. No foraminal narrowing. Postsurgical bone fusion of the posterior
 elements.



L5-S1: Anterior interbody fusion. Posterior decompression. Spinal canal is 
broad. No foraminal narrowing demonstrated.



If there is further concern, CT myelogram or MRI of the lumbar spine may be 
performed for complete assessment.





IMPRESSION:

                                        1.  Intact anterior interbody lumbar fusion at the L4-L5 and L5-S1 levels. No significant

 junctional degenerative changes evident.

                                        2.  No foraminal narrowing demonstrated.







SL:  PHILLIP



                                                          10/15/2018                 

                                                      -

                                        -





Read by:  Bar Blank MD

Dictated Date/time:  10/15/18 13:12

Electronically Signed by:  Bar Blank MD                      10/15/18 
13:24

FINAL REPORT

                                        [facility]                    

 

                          Breast Complete Lucho US                            Breast Complete Lucho US          

               





COMPLETE ULTRASOUND OF BOTH BREASTS AND AXILLA: 2018



CLINICAL: /Nodule.  







COMPARISON:Comparison is made to exams dated:  2018 mammogram, 2018 
mammogram, 2017 ultrasound, 2/15/2016 ultrasound, and 2015 ultrasound - 
[facility].  





TECHNIQUE: Color flow and real-time ultrasound of both breasts four quadrants, 
retroareolar, and axilla regions were performed.  Gray scale images of the real-
time examination were reviewed.   







FINDINGS: 

There is a stable benign 9 mm wider than tall oval nodule with a circumscribed 
margin in the right breast at 10 o'clock in the retroareolar region.  This oval 
nodule is hypoechoic.  

No abnormalities were seen sonographically in the left breast or either axilla. 
 





IMPRESSION: BENIGN 



RECOMMENDATION:There is no sonographic evidence of malignancy.  

The stable 9 mm wider than tall oval nodule in the right breast is consistent 
with a fibroadenoma and is benign.  

A 1 year screening mammogram is recommended.(2019)   This exam was 
interpreted at [id number] for [facility] [place].  

Dion Ramey M.D.  

ap/penrad:2018 14:16:26  



Imaging Technologist(s): Guille Nathan [facility]

letter sent: BI-RADS 1/2  

Ultrasound BI-RADS: 2 Benign

                                                          2018                 

                                                      -

                                        -





Read by:  Dion Ramey MD

Dictated Date/time:  09/06/18 14:16

Electronically Signed by:  Dion Ramey MD                          18 
14:16

FINAL REPORT

                                        [facility]                    

 

                          Breast Mammo Diag UNI incl CAD MA                            Breast Mammo Diag UNI

 incl CAD MA                         





UNILATERAL RIGHT DIGITAL DIAGNOSTIC MAMMOGRAM WITH CAD: 2018





CLINICAL: /Callback.  





Current study was evaluated with a Computer Aided Detection (CAD) system.  



COMPARISON:Comparison is made to exams dated:  2018 mammogram, 2017 
mammogram, 2015 mammogram, 2014 mammogram - [facility], and 2013 mammogram - [facility].   



TECHNIQUE: Mammographic views were obtained using digital acquisition. [employer] 
Version 1.3 was utilized for computer aided detection.  



FINDINGS: 

The tissue of right breast is heterogeneously dense, which could obscure 
detection of small masses.  



There are benign calcifications in the right breast.  

There is a nodule in the right breast at 10 o'clock anterior depth.  This is 
seen in additional views.  

No other significant masses or calcifications are seen in the breast.  





IMPRESSION: INCOMPLETE: NEEDS ADDITIONAL IMAGING EVALUATION



RECOMMENDATION:The nodule in the right breast is indeterminate.  An ultrasound 
is recommended.  

 This exam was interpreted at [id number] for [facility].  



SUMMARY:

Ultrasound will be performed at this time; please see dedicated separate report.
  





Dion Ramey M.D.          

ap/penrad:2018 12:10:40  



Imaging Technologist(s): Alessandra Eng, [facility]



Mammogram BI-RADS: 0 Indeterminate

                                                          2018                 

                                                      -

                                        -





Read by:  Dion Ramey MD

Dictated Date/time:  18 12:10

Electronically Signed by:  Dion Ramey MD                          18 
12:10

FINAL REPORT

                                        [facility]                    

 

                          Breast Mammo Scrn LUCHO w lauryn incl CAD MA                            Breast Mammo Scrn

 LUCHO w lauryn incl CAD MA                         





BILATERAL DIGITAL SCREENING MAMMOGRAM 3D/2D WITH CAD: 2018





CLINICAL: /Routine.  





Current study was evaluated with a Computer Aided Detection (CAD) system.  



COMPARISON:Comparison is made to exams dated:  2017 mammogram, 2015 
mammogram, 2014 mammogram - [facility], 2013 
mammogram - [facility], and 10/8/2009 mammogram - [facility].   



TECHNIQUE: Digital Breast Tomosynthesis was performed and utilized for 
Interpretation. Ave[employer]anta Version 1.3 was utilized for computer aided detection.  



FINDINGS: 

The tissue of both breasts is heterogeneously dense, which could obscure 
detection of small masses.  



There are benign calcifications in the right breast.  

There is a possible mass in the right breast at 10 o'clock anterior depth.  

No other significant masses, calcifications, or other findings are seen in 
either breast.  





IMPRESSION: INCOMPLETE: NEEDS ADDITIONAL IMAGING EVALUATION



RECOMMENDATION:The possible mass in the right breast is indeterminate.  Right 
diagnostic mammogram with possible ultrasound is recommended (spot compression 
and lateral views).  

 This exam was interpreted at [id number] for [facility].  



Shyla tellez/jasper:2018 08:47:34  



Imaging Technologist(s): Lina Mcdowell, [facility]

letter sent: BI-RADS 0  

Mammogram BI-RADS: 0 Indeterminate

                                                          2018                 

                                                      -

                                        -





Read by:  Shyla Santana MD

Dictated Date/time:  18 08:47

Electronically Signed by:  Shyla Santana MD                           18 
08:47

FINAL REPORT

                                        [facility]                    

 

                    CARDIAC ENZYMES                            CK MB                            null    

                          0.5 - 3.6                            2017              

                                                                            [facility]     

               

 

                    CARDIAC ENZYMES                            Troponin-I                            null

                            0.00 - 0.40                            2017        

                                                                            [facility]

                    

 

                    CARDIAC ENZYMES                            CK MB Index                            null

                            0.0 - 2.5                            2017          

                                                                            [facility] 

                   

 

                    CARDIAC ENZYMES                            Total CK                            57 unit/L

                             12 - 191                            2017          

                                                                            [facility] 

                   

 

                    CHEM PANEL                            eGFR                            92 mL/min/1.73m2

                                                         2017                  

                                                      Result Comment: The eGFR is calculated using the

 CKD-EPI formula. In most young, healthy individuals the eGFR will be >90 
mL/min/1.73m2. The eGFR declines with age. An eGFR of 60-89 may be normal in 
some populations, particularly the elderly, for whom the CKD-EPI formula has not
 been extensively validated. Use of the eGFR is not recommended in the following
 populations:



Individuals with unstable creatinine concentrations, including pregnant patients
 and those with serious co-morbid conditions.



Patients with extremes in muscle mass or diet. 



The data above are obtained from the National Kidney Disease Education Program 
(NKDEP) which additionally recommends that when the eGFR is used in patients 
with extremes of body mass index for purposes of drug dosing, the eGFR should be
 multiplied by the estimated BMI.                            [facility]          

          

 

                    CHEM PANEL                            Glucose Lvl                            99 mg/dL

                             70 - 99                            2017           

                                                                            [facility]  

                  

 

                    CHEM PANEL                            BUN                            13 mg/dL       

                          7 - 22                            2017                   

                                                                            [facility]          

          

 

                    CHEM PANEL                            Creatinine Lvl                            0.78

 mg/dL                             0.50 - 1.40                            2017 

                                                                                   [facility]

                    

 

                    CHEM PANEL                            Albumin Lvl                            3.8 g/dL

                             3.5 - 5.0                            2017         

                                                                            [facility]

                    

 

                    CHEM PANEL                            Total Protein                            8.2 g/dL

                             6.4 - 8.4                            2017         

                                                                            [facility]

                    

 

                    CHEM PANEL                            Potassium Lvl                            3.8 meq/L

                             3.5 - 5.1                            2017         

                                                                            [facility]

                    

 

                    CHEM PANEL                            ALT                            21 unit/L      

                          0 - 65                            2017                  

                                                                            [facility]         

           

 

                    CHEM PANEL                            Sodium Lvl                            133 meq/L

                             135 - 145                            2017         

                                                                            [facility]

                    

 

                    CHEM PANEL                            Chloride Lvl                            94 meq/L

                             95 - 109                            2017          

                                                                            [facility] 

                   

 

                    CHEM PANEL                            CO2                            31 meq/L       

                          24 - 32                            2017                  

                                                                            [facility]         

           

 

                    CHEM PANEL                            Calcium Lvl                            9.2 mg/dL

                             8.5 - 10.5                            2017        

                                                                            [facility]

                    

 

                    CHEM PANEL                            AST                            19 unit/L      

                          0 - 37                            2017                  

                                                                            [facility]         

           

 

                    CHEM PANEL                            Alk Phos                            158 unit/L

                             39 - 136                            2017          

                                                                            [facility] 

                   

 

                    CHEM PANEL                            Bili Total                            0.7 mg/dL

                             0.2 - 1.3                            2017         

                                                                            [facility]

                    

 

                    CHEM PANEL                            Globulin                            4.4 g/dL  

                           2.7 - 4.2                            2017           

                                                                            [facility]  

                  

 

                    CHEM PANEL                            A/G Ratio                            0.9      

                          0.7 - 1.6                            2017              

                                                                            [facility]     

               

 

                    CHEM PANEL                            B/C Ratio                            17       

                          6 - 25                            2017                  

                                                                            [facility]         

           

 

                    CHEM PANEL                            AGAP                            11.8 meq/L    

                          10.0 - 20.0                            2017           

                                                                            [facility]  

                  

 

                    ENDOCRINOLOGY                            hCG Tot                            null    

                                                      2017                       

                                                                            [facility]              

      

 

                    HEMATOLOGY                            Monocytes #                            0.7 K/CMM

                             0.0 - 0.8                            2017         

                                                                            [facility]

                    

 

                    HEMATOLOGY                            Eosinophils #                            0.2 K/CMM

                             0.0 - 0.5                            2017         

                                                                            [facility]

                    

 

                    HEMATOLOGY                            Segs-Bands #                            7.4 K/CMM

                             1.5 - 8.1                            2017         

                                                                            [facility]

                    

 

                    HEMATOLOGY                            Lymphocytes #                            1.1 K/CMM

                             1.0 - 5.5                            2017         

                                                                            [facility]                            Basophils                            0.5 %    

                          0.0 - 1.0                            2017             

                                                                            [facility]                            Eosinophils                            1.8 %  

                           0.0 - 4.0                            2017           

                                                                            [facility]                            Monocytes                            7.5 %    

                          2.0 - 12.0                            2017            

                                                                            [facility]                            Segs                            79.0 %        

                          45.0 - 75.0                            2017               

                                                                            [facility]                            Lymphocytes                            11.2 % 

                            20.0 - 40.0                            2017        

                                                                            [facility]                            MPV                            8.7 fL         

                          7.4 - 10.4                            2017                 

                                                                            [facility]                            Platelet                            374 K/CMM 

                            133 - 450                            2017          

                                                                            [facility]                            MCHC                            33.5 g/dL     

                          32.0 - 36.0                            2017            

                                                                            [facility]                            Hct                            36.5 %         

                          36.0 - 48.0                            2017                

                                                                            [facility]                            MCH                            29.1 pg        

                          27.0 - 31.0                            2017               

                                                                            [facility]                            MCV                            87.0 fL        

                          80.0 - 98.0                            2017               

                                                                            [facility]                            Hgb                            12.2 g/dL      

                          12.0 - 16.0                            2017             

                                                                            [facility]                            RDW                            14.1 %         

                          11.5 - 14.5                            2017                

                                                                            [facility]                            RBC                            4.20 M/CMM     

                          4.20 - 5.40                            2017            

                                                                            [facility]                            WBC                            9.4 K/CMM      

                          3.7 - 10.4                            2017              

                                                                            [facility]     

               

 

                    Chest 1view DX                            Chest 1view DX                            

Clinical Indication:  - chest pain; 

Comparison: None



FINDINGS: 



AP chest radiographs shows normal lung volumes without interstitial or airspace 
opacities, pleural effusions or pneumothorax. 



The heart size and pulmonary vasculature are normal. The trachea is midline. 
There are no clinically significant osseous abnormalities noted. 



IMPRESSION:

No chest radiographic evidence of acute cardiopulmonary disease.





SL:  [id number]



                                                          2017                 

                                                      -

                                        -





Read by:  Abdirashid Bray MD

Dictated Date/time:  17 10:20

Electronically Signed by:  Abdirashid Bray MD               17 
10:20

FINAL REPORT

                                        [facility]                    

 

                          Breast Complete Uni US                            Breast Complete Uni US          

                                        - BREAST COMPLETE UNI US/R

ULTRASOUND OF RIGHT BREAST AND RIGHT AXILLA: 2017

CLINICAL: Right breast probably benign Mass.  



Comparison is made to exams dated:  2017 mammogram, 2/15/2016 ultrasound, 
2015 ultrasound, 2014 ultrasound, 2015 mammogram and 2014 
mammogram - [facility].  



Color flow and real-time ultrasound of the right breast and axilla were 
performed.  Gray scale images of the real-time examination were reviewed.  All 4
 quadrants, the retroareolar region and axilla are evaluated in this exam. 



There is a stable benign 9 mm wider than tall oval mass with a circumscribed 
margin in the right breast at 10 o'clock middle depth 1 cm from the nipple.  
This oval mass is hypoechoic with posterior acoustic enhancement.  This 
correlates with ultrasound findings but was not seen on the prior mammogram.  
Color flow imaging demonstrates that there is no vascularity present.  

No abnormalities were seen sonographically in the right axilla.  

There has been no significant interval change.  



IMPRESSION: BENIGN 



There is no sonographic evidence of malignancy.  



The stable 9 mm wider than tall oval mass in the right breast likely represents 
a fibroadenoma and is benign.  This has shown over 2 years of stability.



A 1 year screening mammogram is recommended. The results were reviewed with the 
patient.  





Shyla Santana M.D.  

jt/:2017 11:22:36  



Imaging Technologist: Guille Nathan, [facility]

This exam was dictated and interpreted by [id number] for [facility].  

letter sent: Normal exam  

Ultrasound BI-RADS: 2 Benign

                                                          2017                 

                                                      -

                                        -





Read by:  Shyla Santana MD

Dictated Date/time:  17 11:22

Electronically Signed by:  Shyla Santana MD                           17 
11:22

FINAL REPORT

                                        [facility]                    

 

                          Breast Mammo Diag LUCHO incl CAD MA                            Breast Mammo Diag LUCHO

 incl CAD MA                             - BREAST MAMMO DIAG LUCHO INCL CAD MA

BILATERAL DIGITAL DIAGNOSTIC MAMMOGRAM WITH CAD: 2017

CLINICAL: Probably benign right breast mass follow up.  



Current study was evaluated with a Computer Aided Detection (CAD) system.  

Comparison is made to exams dated:  2015 mammogram, 2014 mammogram - 
[facility], 2013 mammogram - [facility],
 10/8/2009 mammogram - [facility], 2/15/2016 ultrasound and 2015 
ultrasound - [facility].  

The tissue of both breasts is heterogeneously dense, which could obscure 
detection of small masses.  

There are benign calcifications in the right breast.  

No significant masses, calcifications, or other findings are seen in either 
breast.  

There has been no significant interval change.



IMPRESSION: INCOMPLETE: NEEDS ADDITIONAL IMAGING EVALUATION

There is no mammographic abnormality seen in the right breast to correspond with
 the ultrasound finding at 10 o'clock, however ultrasound is recommended.  

The results were reviewed with the patient.  



SUMMARY:

Ultrasound will be performed at this time; please see dedicated separate report.
  Ultrasound will also reevaluate prior probably benign findings.  





Shyla tellez/penrad:2017 11:20:24  



Imaging Technologist: Alessandra Eng, [facility]

This exam was dictated and interpreted by [id number] for [facility].  



Mammogram BI-RADS: 0 Indeterminate

                                                          2017                 

                                                      -

                                        -





Read by:  Shyla Santana MD

Dictated Date/time:  17 11:20

Electronically Signed by:  Shyla Santana MD                           17 
11:20

FINAL REPORT

                                        [facility]                    

 

                    ELECTROLYTES                            Sodium Lvl                            139 meq/L

                             135 - 145                            2017         

                                                                            [facility]

                    

 

                    ELECTROLYTES                            Chloride Lvl                            102 

meq/L                             95 - 109                            2017     

                                                                               [facility]

                    

 

                    ELECTROLYTES                            CO2                            26 meq/L     

                          24 - 32                            2017                

                                                                            [facility]       

             

 

                    ELECTROLYTES                            Potassium Lvl                            3.5

 meq/L                             3.5 - 5.1                            2017   

                                                                                 [facility]

                    

 

                    ELECTROLYTES                            AGAP                            14.5 meq/L  

                           10.0 - 20.0                            2017         

                                                                            [facility]

                    

 

                          Spine lumbar series DX                            Spine lumbar series DX          

                                        Patient Name: ALEJANDRO JACKSON

: 1970; Age: 46 years Female

MR: [id number]

Study: Spine lumbar series DX 1/10/2017 11:37 AM CST



CLINICAL INDICATION: M54.16   Radiculopathy, lumbar region, evaluate for SCS 
implant/battery placement    



COMPARISON: Lumbar spine radiograph on 12/10/2014



FINDINGS:



Status post fusion of L4-L5 and L5-S1. The anterior metallic screw within the L4
 vertebral body appears in unchanged position without evidence of surrounding 
lucency. Several surgical clips noted anterior to the lumbar vertebral bodies. 
No compression fracture. No subluxation. Stable facet arthropathy of the lower 
lumbar spine. Stable position of the spinal stimulator device.



IMPRESSION: 



Status post fusion of L4-L5 and L5-S1. No significant change since the previous 
lumbar spine radiographs on 12/10/2014.



   





:  [id number]



                                                          01/10/2017                 

                                                      -

                                        -





Read by:  Viktoria Bullock MD

Dictated Date/time:  01/10/17 14:14

Electronically Signed by:  Viktoria Bullock MD                  01/10/17 
14:17

FINAL REPORT

                                        [facility]                    

 

                    Pelvis AP DX                            Pelvis AP DX                            Patient

 Name: ALEJANDRO JACKSON

: 1970; Age: 46 years Female

MR: [id number]

Study: Pelvis AP DX 1/10/2017 11:36 AM CST



CLINICAL INDICATION: M54.16   Radiculopathy, lumbar region, evaluate for SCS 
implant/battery placement 



COMPARISON: None



FINDINGS:

Views and laterality: AP pelvis



No displaced fracture or dislocation. Mild degenerative changes of the bilateral
 hips and bilateral sacroiliac joints. Moderate amount of stool within the 
colon. Spinal stimulator device noted.





IMPRESSION: 



No acute bony abnormalities.







SL:  [id number]



                                                          01/10/2017                 

                                                      -

                                        -





Read by:  Viktoria Bullock MD

Dictated Date/time:  01/10/17 14:13

Electronically Signed by:  Viktoria Bullock MD                  01/10/17 
14:14

FINAL REPORT

                                        [facility]                    

 

                          Abdomen/Pelvis w IV contrast CT                            Abdomen/Pelvis w IV contrast

 CT                                     EXAM: CT ABDOMEN AND PELVIS  WITH     CONTRAST 

DATE:2016 12:28 PM CST .

 

ADDITIONAL DATA: None

 

COMPARISON: None



Dose: DLP 1495 mGy-cm

 

TECHNIQUE: CT of the abdomen and pelvis with intravenous contrast. Multiplanar 
reformats.



IV CONTRAST:  100 cc Omnipaque 300

GI CONTRAST: Oral





FINDINGS: 

Lung bases: Clear

Liver:  Within normal limits.

Spleen:  Within normal limits.

Adrenal glands:  Within normal limits.

Gallbladder/biliary:  Cholecystectomy. Mild, physiologic prominence of the 
common bile duct. No intrahepatic biliary dilatation.

Kidneys:  9 mm right renal cortical hypodensity. Otherwise unremarkable. Normal 
excretion on delayed images.

Pancreas:  Within normal limits 



GI tract:  Constipation pattern in the colon. The appendix is not identified 
similar however, there are no right lower quadrant or pericecal inflammatory 
changes. Small bowel, stomach, and GE junction are unremarkable.



Lymph nodes:  Within normal limits.

Urinary bladder:  Within normal limits.

Reproductive structures:  Hysterectomy. Unremarkable ovaries.

Regional skeleton:  Lower lumbar postoperative changes. Spinal stimulation 
device.



 

IMPRESSION:

No acute abnormality in the abdomen or pelvis. No clearly identified exhalation 
for patient's clinical symptoms.



                                                          2016                 

                                                      -

                                        -





Read by:  Rafael Orona MD

Dictated Date/time:  16 16:04

Electronically Signed by:  Rafael Orona MD                 16 
16:08

FINAL REPORT

                                         [facility] [place]                    

 

                          Breast Complete Lucho US                            Breast Complete Lucho US          

                                        - BREAST COMPLETE LUCHO US

ULTRASOUND OF BOTH BREASTS AND BOTH AXILLA: 2/15/2016

CLINICAL: Probably benign finding - follow up

right breast mass.  



Comparison is made to exams dated:  2015 ultrasound, 2015 mammogram, 
2014 ultrasound, 2014 mammogram - [facility] and 
2013 mammogram - [facility].  



Color flow and real-time ultrasound of both breasts and both axilla were 
performed.  Gray scale images of the real-time examination were reviewed.   For 
both breasts, all 4 quadrants, the retroareolar region and axilla are evaluated 
in this exam.  



There is a stable 9 mm wider than tall oval mass with a circumscribed margin in 
the right breast at 10 o'clock middle depth 1 cm from the nipple.  This oval 
mass is hypoechoic with posterior acoustic enhancement.  This correlates with 
ultrasound findings but was not seen on the prior mammogram.  Color flow imaging
 demonstrates that there is no vascularity present.  

No abnormalities were seen sonographically in the left breast or either axilla. 
 



IMPRESSION: PROBABLY BENIGN - FOLLOW-UP RECOMMENDED



The stable 9 mm wider than tall oval mass in the right breast likely represents 
a fibroadenoma and is probably benign. This has shown over 1 year of stability.

 

A follow-up right ultrasound in 12 months is recommended. The results were 
reviewed with the patient.  



Patient will be due for her bilateral diagnostic mammogram in May 2016.  





Shyla tellez/:2/15/2016 13:38:50  



Imaging Technologist: Guille Nathan, [facility]

This exam was dictated and interpreted by [id number] for [facility].  

letter sent: Followup  

Ultrasound BI-RADS: 3 Probably benign

                                                          02/15/2016                 

                                                      -

                                        -





Read by:  Shyla Santana MD

Dictated Date/time:  02/15/16 13:38

Electronically Signed by:  Shyla Santana MD                           02/15/16 
13:38

FINAL REPORT

                                        [facility]                    

 

                          Breast Complete Lucho US                            Breast Complete Lucho US          

                                        - BREAST COMPLETE LUCHO US

ULTRASOUND OF BOTH BREASTS AND BOTH AXILLA: 2015

CLINICAL: Pain.  



Comparison is made to exams dated:  2015 mammogram, 2014 ultrasound, 
2014 mammogram - [facility], 2013 mammogram - 
[facility] and 10/8/2009 mammogram - [facility].  

Color flow and real-time ultrasound of both breasts and both axilla were 
performed.  Gray scale images of the real-time examination were reviewed.   For 
both breasts, all 4 quadrants, the retroareolar region and axilla are evaluated 
in this exam.  



There is a benign appearing oval shaped septated cyst with a circumscribed 
margin with posterior enhancement right breast at 10 o'clock that is an 
incidental finding.  

There is a stable 9 mm wider than tall oval mass with a circumscribed margin in 
the right breast at 10 o'clock middle depth 1 cm from the nipple.  This oval 
mass is hypoechoic with posterior acoustic enhancement.  This correlates with 
ultrasound findings but was not seen on the prior mammogram.  Color flow imaging
 demonstrates that there is no vascularity present.  

No abnormalities were seen sonographically in the left breast or either axilla. 
 

There has been no significant interval change.  



IMPRESSION: PROBABLY BENIGN - FOLLOW-UP RECOMMENDED



The stable 9 mm wider than tall oval mass in the right breast likely represents 
a fibroadenoma and is probably benign.  A follow-up in 6 months is recommended. 
 



There is no mammographic or sonographic abnormality seen in either breast to 
correspond with the pain which likely represents hormonal stimulation and normal
 fibroglandular tissue.  



A follow-up right ultrasound in 6 months is recommended to demonstrate 
stability. 





Shyla juliant/:2015 14:04:51  



Imaging Technologist: Guille Nathan, [facility]

This exam was dictated and interpreted by [id number] for [facility].  

letter sent: Followup  

Ultrasound BI-RADS: 3 Probably benign

                                                          2015                 

                                                      -

                                        -





Read by:  Shyla Santana MD

Dictated Date/time:  05/07/15 14:04

Electronically Signed by:  Shyla Santana MD                           05/07/15 
14:04

FINAL REPORT

                                        [facility]                    

 

                          Digital Mammo DX Lucho MA                            Digital Mammo DX [place] MA        

                                         - DIGITAL MAMMO DX LUCHO MA

BILATERAL DIGITAL DIAGNOSTIC MAMMOGRAM WITH CAD: 2015

CLINICAL: Pain.  



Current study was evaluated with a Computer Aided Detection (CAD) system.  

Comparison is made to exams dated:  2014 mammogram - [facility], 2013 mammogram - [facility], 10/8/2009 mammogram
 - [facility] and 2014 ultrasound - [facility].  

The tissue of both breasts is heterogeneously dense, which could obscure 
detection of small masses.  

No significant masses, calcifications, or other findings are seen in either 
breast.  

There has been no significant interval change.



IMPRESSION: INCOMPLETE: NEEDS ADDITIONAL IMAGING EVALUATION

There is no mammographic abnormality seen in either breast to correspond with 
the pain which likely represents hormonal stimulation and normal fibroglandular 
tissue, however ultrasound is recommended.  





SUMMARY:

Ultrasound will be performed at this time; please see dedicated separate report.
  Ultrasound will also reevaluate prior probably benign findings.  





Shyla tellez/penrad:2015 14:02:01  



Imaging Technologist: Monica Toney, [facility]

This exam was dictated and interpreted by [id number] for [facility].  



Mammogram BI-RADS: 0 Indeterminate

                                                          2015                 

                                                      -

                                        -





Read by:  Shyla Santana MD

Dictated Date/time:  05/07/15 14:02

Electronically Signed by:  Shyla Santana MD                           05/07/15 
14:02

FINAL REPORT

                                        [facility]                    

 

                          Spine thoracic 3 views DX                            Spine thoracic 3 views DX    

                                        Thoracic spine, 3 view:

 

Exam reason: See Clinic [id number]   Thoracic or Lumbosacral Neuritis or 
Radiculitis, Unspecified

 

AP, lateral and swimmer's view were obtained.

Mild thoracic spondylosis is noted. There is minimal thoracic curvature convex 
to the left at the lower thoracic spine. Thoracic vertebral body heights are 
well-maintained. No acute fracture is noted. The spinal stimulator leads are 
noted at the lower thoracic spinal canal dorsally with the lead tips present at 
the T9 and T9-T10 levels, respectively.

 

 

SL:16

                                                          12/10/2014                 

                                                      -

                                        -





Read by:  Jabier Roque MD

Dictated Date/time:  12/10/14 16:35

Electronically Signed by:  Jabier Roque MD              12/10/14 
16:37

FINAL REPORT

                                        [facility]                    

 

                          Spine lumbar series DX                            Spine lumbar series DX          

                                        Lumbar spine  five view:

 

Exam reason: See Clinic Indication   724.4   Thoracic or Lumbosacral Neuritis or
 Radiculitis, Unspecified 

 

Fusion at L4-L5 and L5-S1 is noted. A ventral retention screw is noted at the 
ventral inferior L4 vertebral body. Numerous vascular clips are noted about the 
lumbosacral junction. A retention screw at L5-S1 as well as pedicular and elena 
fixation noted on the previous exam, 2007 have been removed. Since that 
exam, there has been placement of a generator at the dorsal right paramedian 
soft tissues with 2 leads entering the dorsal aspect of the spinal canal at the 
level of T11-T12. The tips of the spinal stimulation leads are present at the 
level of T9-T10. Vertebral body heights and interspaces are otherwise well 
maintained. No spondylolysis or spondylolisthesis is noted. No acute fracture or
 dislocation is noted. Surgical clips are noted at the right upper quadrant 
status post cholecystectomy. Ventral marginal spurring is noted at the 
visualized lower thoracic an upper 3 lumbar vertebral bodies.

 

 

SL:16

                                                          12/10/2014                 

                                                      -

                                        -





Read by:  Jabier Roque MD

Dictated Date/time:  12/10/14 16:29

Electronically Signed by:  Jabier Roque MD              12/10/14 
16:35

FINAL REPORT

                                        [facility]                    

 

                    ELECTROLYTES                            Sodium Lvl                            140 meq/L

                             135 - 145                            10/29/2014         

                                                                            [facility]

                    

 

                    ELECTROLYTES                            Potassium Lvl                            3.5

 meq/L                             3.5 - 5.1                            10/29/2014   

                                                                                 [facility]

                    

 

                    ELECTROLYTES                            CO2                            28 meq/L     

                          24 - 32                            10/29/2014                

                                                                            [facility]       

             

 

                    ELECTROLYTES                            Chloride Lvl                            102 

meq/L                             95 - 109                            10/29/2014     

                                                                               [facility]

                    

 

                    ELECTROLYTES                            AGAP                            13.5 meq/L  

                           10.0 - 20.0                            10/29/2014         

                                                                            [facility]

                    

 

                          Bone Density-Dual Energy Absorptionmetry                            Bone Density-Dual

 Energy Absorptionmetry                             - Bone Density-Dual Energy Absorptionmetry



 BONE DENSITY EVALUATION: 2013



CLINICAL DATA: Post menopausal.  



FINDINGS: 

Bone density evaluation was performed 2013 on the AP L1-L4 region of spine
 using [employer] Dual Energy X-Ray Absorptiometry. The BMD average for the exam is 
1.209  g/cm2. The T-score is 0.20 and the Z-score is -0.90.  These values 
indicate 92.0% for age-matched controls.  This matches the World Health 
Organization's criteria for normal bone density and places the patient within 
normal limits of fracture risk.  



An additional bone density evaluation was performed 2013 on the right 
femur neck using [employer] Dual Energy X-Ray Absorptiometry. The BMD average for the
 exam is 1.250  g/cm2. The T-score is 2.20 and the Z-score is 1.60.  These 
values indicate 119.0% for age-matched controls.  This matches the World Health 
Organization's criteria for normal bone density and places the patient within 
normal limits of fracture risk.  



An additional bone density evaluation was performed 2013 on the right 
total femur area using [employer] Dual Energy X-Ray Absorptiometry. The BMD average 
for the exam is 1.328  g/cm2. The T-score is 2.70 and the Z-score is 2.00.  
These values indicate 122.0% for age-matched controls.  This matches the World 
Health Organization's criteria for normal bone density and places the patient 
within normal limits of fracture risk.  



An additional bone density evaluation was performed 2013 on the left femur
 neck using [employer] Dual Energy X-Ray Absorptiometry. The BMD average for the exam
 is 1.118  g/cm2. The T-score is 1.10 and the Z-score is 0.50.  These values 
indicate 106.0% for age-matched controls.  This matches the World Health 
Organization's criteria for normal bone density and places the patient within 
normal limits of fracture risk.  



An additional bone density evaluation was performed 2013 on the left total
 femur area using [employer] X-Ray Absorptiometry. The BMD average for 
the exam is 1.287  g/cm2. The T-score is 2.40 and the Z-score is 1.70.  These 
values indicate 119.0% for age-matched controls.  This matches the World Health 
Organization's criteria for normal bone density and places the patient within 
normal limits of fracture risk.  



IMPRESSION: BONE DENSITY WITHIN NORMAL LIMITS

Patient is at normal risk for fracture.   



Dr. Montez jara/jsaper:2013 17:26:42  



Imaging Technologist: Jeffrey Iyer [facility]

                                                          2013                 

                                                      -

                                        -





Read by:  Montez Stock

Dictated Date/time:  13 17:26

Electronically Signed by:  Montez Stock MD         13 
17:26

FINAL REPORT

                                        [facility]                    

 

                          Digital Mammo Screening [place] MA                            Digital Mammo Screening 

[place] MA                                  AMENDMENT: 2013   Bladimir Carvajal M.D. 

Previous outside mammograms from  have been received.  No adverse interval 
change accounting for differences in technique.  Patient should return for 
yearly screening mammogram. 



Amended BI-RADS: 1 Negative 

letter sent: Normal exam

 - DIGITAL MAMMO SCREENING [facility] MA

BILATERAL DIGITAL SCREENING MAMMOGRAM WITH CAD: 2013

CLINICAL: Routine.  



Current study was evaluated with a Computer Aided Detection (CAD) system.  

No prior exams were available for comparison.  Current study contains 8 films.  

The tissue of both breasts is heterogeneously dense. This may lower the 
sensitivity of mammography.  

No significant masses, calcifications, or other findings are seen in either 
breast.  



IMPRESSION: NEGATIVE

There is no mammographic evidence of malignancy.  A screening mammogram in one 
year is recommended. 



Le Thrasher          

sm/penrad:6/3/2013 09:18:44  



Imaging Technologist: Armand Martinez RT(R)(M), [facility]

letter sent: Normal exam  

Mammogram BI-RADS: 1 Negative

                                                          2013                 

                                                      -

                                        -





Read by:  Le Thrasher

Dictated Date/time:  13 17:11

Electronically Signed by:  Le Thrasher MD         13 
17:11

FINAL REPORT

                                        -

                                        -





Read by:  Le Thrasher

Dictated Date/time:  13 09:18

Electronically Signed by:  Le Thrasher MD         13 
09:18

FINAL REPORT

                                        [facility]                    

 

                    CHEMISTRY                            U Preg                            Negative 

                          (2013 05:30:00)                                Negative                     

                    2013                            Normal                                    

                                        [facility]                    



                                                                                
                                                                                
                                                                                
                                                                                
                                                                                
                                                                                
                                                                                
                                                                                
                                                                                
                                                                                
                                                                                
                                                                                
                                                                                
                                                                                
                                                                                
                                



Vital Signs

                    





                    Vital Sign                            Value                            Date         

                          Comments                            Source                    

 

                    Systolic (mm Hg)                            115                             2017

                                                        [facility]                 

   

 

                    Diastolic (mm Hg)                            57                             2017

                                                        [facility]                 

   

 

                    Respitory Rate                            15                             2017 

                                                       [facility]                  

  

 

                    Temperature Oral (F)                            98.4 F                            2017

                                                        [facility]                 

   

 

                    Respitory Rate                            15                             2017 

                                                       [facility]                  

  

 

                    Systolic (mm Hg)                            96                             2017

                                                         [facility]                 

   

 

                    Diastolic (mm Hg)                            55                             2017

                                                         [facility]                 

   

 

                    Systolic (mm Hg)                            99                             2017

                                                         [facility]                 

   

 

                    Diastolic (mm Hg)                            50                             2017

                                                         [facility]                 

   

 

                    Respitory Rate                            14                             2017 

                                                       [facility]                  

  

 

                    Heart Rate                            77                             2017     

                                                       [facility]                    

 

                    Weight                            109.091                             2017    

                                                       [facility]                    

 

                    Height                            157.48 cm                            2017   

                                                      [facility]                    



 

                    Temperature Oral (F)                            98.4 F                            2017

                                                        [facility]                 

   

 

                    BMI Calculated                            43.99                             2017

                                                         [facility]                 

   

 

                    Respitory Rate                            20                             05/15/2017 

                                                        [facility]                  

  

 

                    Respitory Rate                            18                             05/15/2017 

                                                        [facility]                  

  

 

                    Systolic (mm Hg)                            105                             05/15/2017

                                                         [facility]                 

   

 

                    Diastolic (mm Hg)                            89                             05/15/2017

                                                         [facility]                 

   

 

                    Systolic (mm Hg)                            150                             05/15/2017

                                                         [facility]                 

   

 

                    Diastolic (mm Hg)                            65                             05/15/2017

                                                         [facility]                 

   

 

                    Respitory Rate                            16                             05/15/2017 

                                                        [facility]                  

  

 

                    Systolic (mm Hg)                            116                             05/15/2017

                                                         [facility]                 

   

 

                    Diastolic (mm Hg)                            67                             05/15/2017

                                                        [facility]                 

   

 

                    BMI Calculated                            42.01                             2017

                                                        [facility]                 

   

 

                    Weight                            104.176                             2017    

                                                      [facility]                    

 

                    Height                            157.48 cm                            2017   

                                                      [facility]                    



 

                    Temperature Oral (F)                            98.0 F                            2017

                                                        [facility]                 

   

 

                    Heart Rate                            92                             2017     

                                                       [facility]                    

 

                    Systolic (mm Hg)                            131                             10/31/2014

                                                         [facility]                 

   

 

                    Diastolic (mm Hg)                            84                             10/31/2014

                                                         [facility]                 

   

 

                    Respitory Rate                            12                             10/31/2014 

                                                        [facility]                  

  

 

                    Respitory Rate                            20                             10/31/2014 

                                                        [facility]                  

  

 

                    Systolic (mm Hg)                            110                             10/31/2014

                                                         [facility]                 

   

 

                    Diastolic (mm Hg)                            56                             10/31/2014

                                                         [facility]                 

   

 

                    Systolic (mm Hg)                            94                             10/31/2014

                                                         [facility]                 

   

 

                    Respitory Rate                            21                             10/31/2014 

                                                        [facility]                  

  

 

                    Diastolic (mm Hg)                            54                             10/31/2014

                                                         [facility]                 

   

 

                    Height                            157.48 cm                            10/29/2014   

                                                      [facility]                    



 

                    BMI Calculated                            36.66                             10/29/2014

                                                         [facility]                 

   

 

                    Weight                            90.909                             10/29/2014     

                                                      [facility]                    

 

                    Temperature Oral (F)                            98.5 F                            10/29/2014

                                                         [facility]                 

   

 

                    Diastolic (mm Hg)                            54                             2013

                                                         [facility]                 

   

 

                    Systolic (mm Hg)                            114                             2013

                                                         [facility]                 

   

 

                    Respitory Rate                            17                             2013 

                                                        [facility]                  

  

 

                    Respitory Rate                            15                             2013 

                                                        [facility]                  

  

 

                    Diastolic (mm Hg)                            59                             2013

                                                         [facility]                 

   

 

                    Systolic (mm Hg)                            108                             2013

                                                         [facility]                 

   

 

                    Diastolic (mm Hg)                            68                             2013

                                                         [facility]                 

   

 

                    Systolic (mm Hg)                            119                             2013

                                                         [facility]                 

   

 

                    Respitory Rate                            24                             2013 

                                                        [facility]                  

  

 

                    Heart Rate                            85                             2013     

                                                       [facility]                    

 

                    Height                            157.48 cm                            2013   

                                                       [facility]                    



 

                    Weight                            90.909                             2013     

                                                      [facility]                    

 

                    Heart Rate                            78                             2013     

                                                      [facility]                    

 

                    Temperature Oral (F)                            98.7 F                            2013

                                                         [facility]                 

   

 

                    Systolic (mm Hg)                            117                             2012

                                                         [facility]                 

   

 

                    Diastolic (mm Hg)                            51                             2012

                                                         [facility]                 

   

 

                    Respitory Rate                            12                             2012 

                                                        [facility]                  

  

 

                    Systolic (mm Hg)                            115                             2012

                                                         [facility]                 

   

 

                    Diastolic (mm Hg)                            74                             2012

                                                         [facility]                 

   

 

                    Respitory Rate                            14                             2012 

                                                        [facility]                  

  

 

                    Systolic (mm Hg)                            138                             2012

                                                         [facility]                 

   

 

                    Diastolic (mm Hg)                            82                             2012

                                                         [facility]                 

   

 

                    Respitory Rate                            11                             2012 

                                                        [facility]                  

  

 

                    Weight                            104.545                             2012    

                                                       [facility]                    

 

                    Height                            157.48 cm                            2012   

                                                      [facility]                    



 

                    Heart Rate                            72                             2012     

                                                       [facility]                    

 

                    Respitory Rate                            16                             2012 

                                                        [facility]                  

  

 

                    Systolic (mm Hg)                            127                             2012

                                                         [facility]                 

   

 

                    Diastolic (mm Hg)                            79                             2012

                                                         [facility]                 

   

 

                    Respitory Rate                            20                             2012 

                                                        [facility]                  

  

 

                    Diastolic (mm Hg)                            89                             2012

                                                         [facility]                 

   

 

                    Systolic (mm Hg)                            118                             2012

                                                         [facility]                 

   

 

                    Diastolic (mm Hg)                            97                             2012

                                                         [facility]                 

   

 

                    Systolic (mm Hg)                            126                             2012

                                                         [facility]                 

   

 

                    Respitory Rate                            19                             2012 

                                                       [facility]                  

  

 

                    Temperature Oral (F)                            98.7 F                            2012

                                                        [facility]                 

   

 

                    Heart Rate                            85                             2012     

                                                       [facility]                    

 

                    Weight                            104.545                             2012    

                                                       [facility]                    

 

                    Height                            157.48 cm                            2012   

                                                       [facility]                    



 

                    Systolic (mm Hg)                            119                             2012

                                                         [facility]                 

   

 

                    Diastolic (mm Hg)                            65                             2012

                                                         [facility]                 

   

 

                    Respitory Rate                            15                             2012 

                                                        [facility]                  

  

 

                    Systolic (mm Hg)                            133                             2012

                                                         [facility]                 

   

 

                    Diastolic (mm Hg)                            64                             2012

                                                         [facility]                 

   

 

                    Respitory Rate                            15                             2012 

                                                        [facility]                  

  

 

                    Systolic (mm Hg)                            134                             2012

                                                         [facility]                 

   

 

                    Diastolic (mm Hg)                            81                             2012

                                                         [facility]                 

   

 

                    Heart Rate                            82                             2012     

                                                       [facility]                    

 

                    Respitory Rate                            20                             2012 

                                                        [facility]                  

  

 

                    Heart Rate                            70                             01/10/2012     

                                                       [facility]                    

 

                    Temperature Oral (F)                            98.1 F                            01/10/2012

                                                         [facility]                 

   

 

                    Height                            157.48 cm                            01/10/2012   

                                                       [facility]                    



 

                    Weight                            95.455                             01/10/2012     

                                                       [facility]                    

 

                    Diastolic (mm Hg)                            60.0                             10/26/2011

                                                         [facility]                 

   

 

                    Systolic (mm Hg)                            128.0                             10/26/2011

                                                         [facility]                 

   

 

                    Respitory Rate                            18.0                             10/26/2011

                                                         [facility]                 

   

 

                    Respitory Rate                            21.0                             10/26/2011

                                                         [facility]                 

   

 

                    Systolic (mm Hg)                            133.0                             10/26/2011

                                                         [facility]                 

   

 

                    Diastolic (mm Hg)                            55.0                             10/26/2011

                                                         [facility]                 

   

 

                    Respitory Rate                            20.0                             10/26/2011

                                                         [facility]                 

   

 

                    Systolic (mm Hg)                            126.0                             10/26/2011

                                                         [facility]                 

   

 

                    Diastolic (mm Hg)                            80.0                             10/26/2011

                                                         [facility]                 

   

 

                    Weight                            81.818                             10/26/2011     

                                                       [facility]                    

 

                    Height                            160.02 cm                            10/26/2011   

                                                       [facility]                    



 

                    Heart Rate                            81.0                             10/26/2011   

                                                      [facility]                    



 

                    Temperature Oral (F)                            97.6 F                            10/26/2011

                                                        [facility]                 

   



                                                                                
                                                                                
                                                                                
                                                                                
                                                                                
                                                                                
                                                                                
                                                                                
                                                                                
                                                                                
                                                                                
                                                                                
                                                                                
                                                                                
                                                                                
                                                                                
                                                                                
                                                                                
                                                                                
                                                                                
                                                                                
                                                                                
                                        



Encounters

                    





                    Location                            Location Details                            Encounter

 Type                            Encounter Number                            Reason For

 Visit                            Attending Provider                            ADM Date

                            DC Date                            Status                

                                        Source                    

 

                    [facility]                                                        DS              

                    [id number]                                                        [facility]

 [facility]                             10/26/2011                            10/26/2011  

                          Active                            [facility]                                                        DS              

                    [id number]                                                        [facility]

 [facility]                             2012  

                          Active                            [facility]                                                        DS              

                    [id number]                                                        [facility]

 [facility]                             2012  

                          Active                            [facility]                                                        DS              

                    [id number]                                                        [facility]

 [facility]                             2012  

                          Active                            [facility]                                                        DS              

                    [id number]                                                        [facility]

 [facility]                             2013  

                          Active                            [facility]                                               

                    Outpatient                            [id number]                            

                            Lynne Guerrero                             2014                                                    

                                        [facility]                                               

                          OBS Day Surgery                            [id number]                     

                                                [facility] Hamid                             10/31/2014  

                          10/31/2014                                                 

                                        [facility]                                               

                    Outpatient                            [id number]                            

                            Basem Hamid                             12/10/2014       

                     2014                                                        

[facility]                                               

                    Outpatient                            [id number]                            

                            Non Physician                             2015                                                    

                                        [facility]                                               

                    Outpatient                            [id number]                            

                            Lynne Soleja                             02/15/2016     

                          2016                                                    

                                        [facility] [place]                                                

                          Outpt Diag Services                            [id number]                  

                                                Marcellus Bui                             2016                                               

                                         [facility] [place]                    

 

                          [facility]                                               

                    Outpatient                            [id number]                            

                            Basem Hamid                             01/10/2017       

                     2017                                                        

[facility]                                               

                    Outpatient                            [id number]                            

                            Marcellus Bui                             2017                                                     

                                        [facility]                                               

                          Bedded Outpatient                            [id number]                   

                                                Lobo Vailbry                             05/15/2017

                            05/15/2017                                               

                                        [facility]                                               

                    Emergency                            [id number]                             

                           Navid Iheme                             2017                                                        [facility]                                                        Outpatient      

                          [id number]                            [facility]                    

                    BASEM HAMID                                                                    

                          Cancel                            [facility]                    

 

                                                                        OD                            

[id number]                            715.09 - GENERAL OSTEOAR                   

                    LYNNE SOLEJA                                                                 

                          Cancel                             [facility]             

       



                                                                                
                                                                                
                                                                                
                                    



Procedures

                    





                    Procedure                            Code                            Date           

                          Perfomer                            Comments                        

                                        Source                    

 

                    Appendectomy                            [id number]                                    

                                                                             [place]

                    

 

                    Bladder operation                            [id number]                               

                                                                                  [place]

                    

 

                    Cholecystectomy                            [id number]                                 

                                                                                [place]

                    

 

                          Fusion of lumbar spine                            [id number]                        

                                                                                                    

 [place]                    

 

                    Hernia repair                            [id number]                                   

                                                                              [place]

                    

 

                    Hysterectomy                            [id number]                                   

                                                                              [place]

                    

 

                          Implantation of electronic stimulator of spine                            [id number]

                                                                                       

                                         [place]                    

 

                          Procedure<sup>1</sup>                            [id number]                         

                                                                            multiple back procedures    

                                         [place]                    

 

                    Appendectomy                            [id number]                                    

                                                                             [facility] [place]

                    

 

                    Bladder operation                            [id number]                               

                                                                                  [facility]

 [place]                    

 

                    Cholecystectomy                            [id number]                                 

                                                                                [facility] 

[place]                    

 

                          Fusion of lumbar spine                            [id number]                        

                                                                                                    

 [facility] [place]                    

 

                    Hernia repair                            [id number]                                   

                                                                              [facility] [place]

                    

 

                    Hysterectomy                            [id number]                                   

                                                                              [facility] [place]

                    

 

                          Procedure<sup>1</sup>                            [id number]                         

                                                                            multiple back procedures    

                                         [facility] [place]                    

 

                          Procedure <sup>1</sup>                            [id number]                       

                                                                            1multiple back procedures 

                                        [facility]

## 2019-03-13 NOTE — NUR
rec'd pt in rm 11 with c/o abd. pain.  pt. stated she  took her own percocet 
and morphine at home at approx. 8:30 this morning.

## 2019-03-13 NOTE — XMS REPORT
Clinical Summary

                             Created on: 2019



Domitila Ramírez

External Reference #: [id number]

: 1970

Sex: Female



Demographics







                          Address                    [address] 

[place]  [address]

 

                          Home Phone                [phone]

 

                          Preferred Language        English

 

                          Marital Status            Unknown

 

                          [employer] Affiliation     [employer]

 

                          Race                      White

 

                          Ethnic Group              /Latin





Author







                          Author                    [facility]

 

                          Organization              [facility]

 

                          Address                   Unknown

 

                          Phone                     Unavailable







Support







                Name            Relationship    Address         Phone

 

                    melissa german     [employer]                 [address] 

[place]  [address]                     [phone]







Care Team Providers







                    Care Team Member Name    Role                Phone

 

                    Marcellus Bui MD    PCP                 [phone]







Allergies







                                        Comments



                 Active Allergy     Reactions       Severity        Noted Date 

 

                                         



                     Aspirin             Hives               2018 

 

                                         



                     Sulfamethoxazole-Trimetho     Hives               2018 



                                         prim    

 

                                         



                     Latex               Hives               2018 

 

                                         



                     Methadone           Hives               2018 

 

                                         



                     Penicillins         Hives               2018 

 

                                         



                     Sulfa (Sulfonamide     Hives               2018 



                                         Antibiotics)    







Medications







                          End Date                  Status



              Medication     Sig          Dispensed     Refills      Start  



                                         Date  

 

                                                    Active



                     ARIPiprazole (ABILIFY) 5     Take 5 mg by        0   



                           MG tablet                 mouth daily.     

 

                                                    Active



                     cetirizine (ZYRTEC) 10 MG     Take 10 mg by       0   



                           tablet                    mouth daily.     

 

                                                    Active



                     clonazePAM (KLONOPIN) 0.5     Take 0.5 mg         0   



                           MG tablet                 by mouth     



                                         daily as     



                                         needed for     



                                         Anxiety.     

 

                                                    Active



                     furosemide (LASIX) 40 MG     Take 40 mg by       0   



                           tablet                    mouth daily.     

 

                                                    Active



                     gabapentin (NEURONTIN)     Take 600 mg         0   



                           600 MG tablet             by mouth 4     



                                         (four) times     



                                         daily.     

 

                                                    Active



                     ipratropium-albuterol     Take 3 mLs by       0   



                           (DUO-NEB) 0.5 mg-3 mg(2.5     nebulization     



                           mg base)/3 mL nebulizer     every 4     



                           solution                  (four) hours     



                                         as needed for     



                                         Wheezing.     

 

                                                    Active



                     lidocaine (XYLOCAINE) 5 %     Apply               0   



                           ointment                  topically     



                                         daily.     

 

                                                    Active



                     montelukast (SINGULAIR)     Take 10 mg by       0   



                           10 mg tablet              mouth     



                                         nightly.     

 

                                                    Active



                     omeprazole (PRILOSEC) 40     Take 40 mg by       0   



                           MG capsule                mouth 2 (two)     



                                         times daily.     

 

                                                    Active



                     ondansetron (ZOFRAN) 4 MG     Take 4 mg by        0   



                           tablet                    mouth 2 (two)     



                                         times daily.     

 

                                                    Active



                     albuterol HFA (VENTOLIN     Inhale 1 puff       0   



                           HFA) 90 mcg/actuation     by mouth via     



                           inhaler                   inhaler every     



                                         4 (four)     



                                         hours as     



                                         needed for     



                                         Wheezing.     

 

                                                    Active



                     docusate sodium (COLACE)     Take 100 mg         0   



                           100 MG capsule            by mouth 2     



                                         (two) times     



                                         daily.     

 

                          2019                Active



              bisacodyl (DULCOLAX) 10     Place 1      90           0              



                 mg suppository     suppository     suppository      8  



                                         (10 mg total)     



                                         rectally     



                                         daily as     



                                         needed for up     



                                         to 90 days.     

 

                          2019                Active



              ferrous sulfate 325 (65     Take 1 tablet     45 tablet     3              



                     FE) MG tablet       (325 mg             8  



                                         total) by     



                                         mouth every     



                                         other day.     

 

                          2019                Active



              ascorbic Acid (VITAMIN C)     Take 1       90 capsule     3              



                     500 mg CpER SR capsule     capsule (500        8  



                                         mg total) by     



                                         mouth daily     



                                         for 90 days.     

 

                          2018                Discontinued



                     hydroCHLOROthiazide     Take 25 mg by       0   



                           (HYDRODIURIL) 25 MG       mouth daily.     



                                         tablet      

 

                          2018                Discontinued



                     losartan (COZAAR) 50 MG     Take 50 mg by       0   



                           tablet                    mouth daily.     

 

                          2018                Discontinued



                     morphine (MSIR) 15 MG     Take 15 mg by       0   



                           tablet                    mouth every     



                                         12 (twelve)     



                                         hours.     

 

                          2018                Discontinued



                     oxyCODONE-acetaminophen     Take 1 tablet       0   



                           (PERCOCET)  mg per     by mouth     



                           tablet                    every 6 (six)     



                                         hours.     

 

                          2019                



              naloxegol 12.5 mg Oral     Take 1 tablet     60 tablet     1              



                     Tab tablet          (12.5 mg            8  



                                         total) by     



                                         mouth daily     



                                         for 60 days.     

 

                          2018                



              ondansetron (ZOFRAN-ODT)     Take 1 tablet     20 tablet     0              



                     4 MG disintegrating     (4 mg total)        8  



                           tablet                    by mouth     



                                         every 8     



                                         (eight) hours     



                                         as needed for     



                                         up to 7 days.     

 

                          2018                



              polyethylene glycol     Take 17 g by     306 g        3              



                     (GLYCOLAX) 17 gram packet     mouth 2 (two)       8  



                                         times daily     



                                         for 9 days.     

 

                          2019                



              oxyCODONE-acetaminophen     Take 1 tablet     64 tablet     0              



                     (PERCOCET)  mg per     by mouth            8  



                           tablet                    every 6 (six)     



                                         hours for 16     



                                         days. Max     



                                         Daily Amount:     



                                         4 tablets     

 

                          2019                



              morphine (MSIR) 15 MG     Take 1 tablet     32 tablet     0              



                     tablet              (15 mg total)       8  



                                         by mouth     



                                         every 12     



                                         (twelve)     



                                         hours for 16     



                                         days. Max     



                                         Daily Amount:     



                                         30 mg     







Active Problems







 



                           Problem                   Noted Date

 

 



                           Hypokalemia               2018

 

 



                           Small bowel obstruction     2018







Encounters







                          Care Team                 Description



                     Date                Type                Specialty  

 

                                        



Shama Phoenix MD                    LAPAROTOMY,EXPLORATORY



                           2018                Surgery   

 

                                        



Rhonda Perez MD                  



                           2018                Anesthesia   



                                         Event   

 

                                        



Ruthy Sandhu MD Shamsee, Liana Buchanan, Josiah Maravilla MD Agrawal, Neeraj, MD Heinen, Stephanie GARCIA MD                  Small bowel obstruction (HCC); 

Hypokalemia; 

Essential hypertension; 

Right acute serous otitis media, recurrence not specified; 

Chronic pain syndrome; 

Therapeutic opioid induced constipation; 

Iron deficiency anemia secondary to inadequate dietary iron intake; 

Iron deficiency anemia, unspecified iron deficiency anemia type



                     2018          [facility]            General Internal Medicine  



                           -                         Encounter   



                                         2018    



after 2018



Family History







   



                 Medical History     Relation        Name            Comments

 

   



                           Diabetes                  Father  

 

   



                           Hypertension              Father  

 

   



                           Diabetes                  Mother  

 

   



                           Hypertension              Mother  

 

   



                           Diabetes                  Paternal  



                                         Grandfather  

 

   



                           Hypertension              Paternal  



                                         Grandfather  

 

   



                           Diabetes                  Paternal  



                                         Grandmother  

 

   



                           Hypertension              Paternal  



                                         Grandmother  









   



                 Relation        Name            Status          Comments

 

   



                                         Father   

 

   



                                         Mother   

 

   



                                         Paternal Grandfather   

 

   



                                         Paternal Grandmother   







Social History







                                        Date



                 Tobacco Use     Types           Packs/Day       Years Used 

 

                                         



                                         Never Smoker    

 

    



                                         Smokeless Tobacco: Never   



                                         Used   









   



                 Alcohol Use     Drinks/Week     oz/Week         Comments

 

   



                                         No   









  



                     Alcohol Habits      Answer              Date Recorded

 

  



                     How often do you have a drink containing alcohol?     Never               2018

 

  



                           How many drinks containing alcohol do you have on     Not asked 



                                         a typical day when you are drinking?  

 

  



                           How often do you have six or more drinks on one     Not asked 



                                         occasion?  









 



                           Sex Assigned at Birth     Date Recorded

 

 



                                         Not on file 









                                        Industry



                           Job Start Date            Occupation 

 

                                        Not on file



                           Not on file               Not on file 









                                        Travel End



                           Travel History            Travel Start 

 





                                         No recent travel history available.







Last Filed Vital Signs







                                        Time Taken



                           Vital Sign                Reading 

 

                                        2018 11:37 AM CST



                           Blood Pressure            110/52 

 

                                        2018 11:37 AM CST



                           Pulse                     94 

 

                                        2018 11:37 AM CST



                           Temperature               36.3 C (97.3 F) 

 

                                        2018 11:37 AM CST



                           Respiratory Rate          18 

 

                                        2018 11:37 AM CST



                           Oxygen Saturation         97% 

 

                                        -



                           Inhaled Oxygen            - 



                                         Concentration  

 

                                        12/15/2018  5:40 AM CST



                           Weight                    80.7 kg (178 lb) 

 

                                        2018 10:57 AM CST



                           Height                    157.5 cm (5' 2") 

 

                                        12/15/2018  5:40 AM CST



                           Body Mass Index           32.56 







Plan of Treatment





Not on file



Implants







                    Device Identifier    Shelf Expiration Date    Model / Serial / Lot



                 Implanted       Type            Area            Manufactur   



                                         er   

 

                                        2020          4301-02 /

 /

                                        [id number]



                     Memb [employer] 5x6     Cement/Lenny          [employer]   



                     4301-02 - [id number]     ler/Adhesi          ELLA:   



                     Implanted: Qty: 1 on 2018 by     Shama Stewart MD      

 

                                        10/31/2020          4301-02 /

 /

                                        [id number]



                     Memb [employer] 5x6     Cement/Lenny          [employer]   



                     4301-02 - [id number]     ler/Adhesi          ELLA:   



                     Implanted: Qty: 1 on 2018 by     Shama Stewart MD      







Procedures







                                        Comments



                 Procedure Name     Priority        Date/Time       Associated Diagnosis 

 

                                        



Results for this procedure are in the results section.



                     CBC W/PLT COUNT & AUTO     Routine             2018  



                           DIFFERENTIAL              4:47 AM CST  

 

                                        



Results for this procedure are in the results section.



                     CBC W/PLT COUNT & AUTO     Routine             2018  



                           DIFFERENTIAL              4:47 AM CST  

 

                                        



Results for this procedure are in the results section.



                     CBC W/PLT COUNT & AUTO     Routine             2018  



                           DIFFERENTIAL              3:51 AM CST  

 

                                        



Results for this procedure are in the results section.



                     PHOSPHORUS          Routine             2018  



                                         3:51 AM CST  

 

                                        



Results for this procedure are in the results section.



                     MAGNESIUM           Routine             2018  



                                         3:51 AM CST  

 

                                        



Results for this procedure are in the results section.



                     BASIC METABOLIC PANEL (7)     Routine             2018  



                                         3:51 AM CST  

 

                                        



Results for this procedure are in the results section.



                     CBC W/PLT COUNT & AUTO     Routine             2018  



                           DIFFERENTIAL              3:51 AM CST  

 

                                        



Results for this procedure are in the results section.



                     CBC (HEMOGRAM ONLY)     Routine             2018  



                                         3:27 PM CST  

 

                                        



Results for this procedure are in the results section.



                     CBC W/PLT COUNT & AUTO     Routine             2018  



                           DIFFERENTIAL              4:10 AM CST  

 

                                        



Results for this procedure are in the results section.



                     RETICULOCYTE COUNT     Routine             2018  



                                         4:10 AM CST  

 

                                        



Results for this procedure are in the results section.



                     PHOSPHORUS          Routine             2018  



                                         4:10 AM CST  

 

                                        



Results for this procedure are in the results section.



                     MAGNESIUM           Routine             2018  



                                         4:10 AM CST  

 

                                        



Results for this procedure are in the results section.



                     BASIC METABOLIC PANEL (7)     Routine             2018  



                                         4:10 AM CST  

 

                                        



Results for this procedure are in the results section.



                     CBC W/PLT COUNT & AUTO     Routine             2018  



                           DIFFERENTIAL              4:10 AM CST  

 

                                        



Results for this procedure are in the results section.



                     POCT-GLUCOSE METER     Routine             12/15/2018  



                                         5:42 PM CST  

 

                                        



Results for this procedure are in the results section.



                     C. DIFFICILE GDH TOXIN     Routine             12/15/2018  



                                         2:12 PM CST  

 

                                        



Results for this procedure are in the results section.



                     HEMOGLOBIN AND HEMATOCRIT     Routine             12/15/2018  



                                         1:12 PM CST  

 

                                        



Results for this procedure are in the results section.



                     VITAMIN B12 AND FOLATE     Routine             12/15/2018  



                                         1:12 PM CST  

 

                                        



Results for this procedure are in the results section.



                     FERRITIN            Routine             12/15/2018  



                                         1:12 PM CST  

 

                                        



Results for this procedure are in the results section.



                     IRON, TIBC, % SAT.     Routine             12/15/2018  



                           (WITHOUT FERRITIN)        1:12 PM CST  

 

                                        



Results for this procedure are in the results section.



                     POCT-GLUCOSE METER     Routine             12/15/2018  



                                         11:24 AM CST  

 

                                        



Results for this procedure are in the results section.



                     POCT-GLUCOSE METER     Routine             12/15/2018  



                                         5:31 AM CST  

 

                                        



Results for this procedure are in the results section.



                     CBC W/PLT COUNT & AUTO     Routine             12/15/2018  



                           DIFFERENTIAL              5:12 AM CST  

 

                                        



Results for this procedure are in the results section.



                     PHOSPHORUS          Routine             12/15/2018  



                                         5:12 AM CST  

 

                                        



Results for this procedure are in the results section.



                     MAGNESIUM           Routine             12/15/2018  



                                         5:12 AM CST  

 

                                        



Results for this procedure are in the results section.



                     BASIC METABOLIC PANEL (7)     Routine             12/15/2018  



                                         5:12 AM CST  

 

                                        



Results for this procedure are in the results section.



                     CBC W/PLT COUNT & AUTO     Routine             12/15/2018  



                           DIFFERENTIAL              5:12 AM CST  

 

                                        



Results for this procedure are in the results section.



                     POCT-GLUCOSE METER     Routine             2018  



                                         10:21 PM CST  

 

                                        



Results for this procedure are in the results section.



                     POCT-GLUCOSE METER     Routine             2018  



                                         12:35 PM CST  

 

                                        



Results for this procedure are in the results section.



                     POCT-GLUCOSE METER     Routine             2018  



                                         5:54 AM CST  

 

                                        



Results for this procedure are in the results section.



                     POCT-GLUCOSE METER     Routine             2018  



                                         12:21 AM CST  

 

                                        



Results for this procedure are in the results section.



                     XR CHEST 1 VIEW     Routine             2018  



                           PORTABLE/BEDSIDE          5:54 PM CST  

 

                                        



Results for this procedure are in the results section.



                     ECG 12-LEAD         Routine             2018  



                                         4:33 PM CST  

 

                                        



Results for this procedure are in the results section.



                     POCT-GLUCOSE METER     Routine             2018  



                                         4:29 PM CST  

 

                                        



Results for this procedure are in the results section.



                     CT ABDOMEN/PELVIS WITH IV     Routine             2018  



                           CONTRAST                  4:13 PM CST  

 

                                        



Results for this procedure are in the results section.



                     POCT-GLUCOSE METER     Routine             2018  



                                         1:28 PM CST  

 

                                        



Results for this procedure are in the results section.



                     CT ABDOMEN/PELVIS WITHOUT     STAT                2018  



                           IV CONTRAST               1:27 PM CST  

 

                                        



Results for this procedure are in the results section.



                     POCT-GLUCOSE METER     Routine             2018  



                                         6:07 AM CST  

 

                                        



Results for this procedure are in the results section.



                     CBC W/PLT COUNT & AUTO     Routine             2018  



                           DIFFERENTIAL              5:18 AM CST  

 

                                        



Results for this procedure are in the results section.



                     PHOSPHORUS          Routine             2018  



                                         5:18 AM CST  

 

                                        



Results for this procedure are in the results section.



                     MAGNESIUM           Routine             2018  



                                         5:18 AM CST  

 

                                        



Results for this procedure are in the results section.



                     BASIC METABOLIC PANEL (7)     Routine             2018  



                                         5:18 AM CST  

 

                                        



Results for this procedure are in the results section.



                     CBC W/PLT COUNT & AUTO     Routine             2018  



                           DIFFERENTIAL              5:18 AM CST  

 

                                        



Results for this procedure are in the results section.



                     POCT-GLUCOSE METER     Routine             2018  



                                         11:16 PM CST  

 

                                        



Results for this procedure are in the results section.



                     POCT-GLUCOSE METER     Routine             2018  



                                         5:10 PM CST  

 

                                        



Results for this procedure are in the results section.



                     POCT-GLUCOSE METER     Routine             2018  



                                         1:07 PM CST  

 

                                        



Results for this procedure are in the results section.



                     CBC W/PLT COUNT & AUTO     Routine             2018  



                           DIFFERENTIAL              5:59 AM CST  

 

                                        



Results for this procedure are in the results section.



                     PHOSPHORUS          Routine             2018  



                                         5:59 AM CST  

 

                                        



Results for this procedure are in the results section.



                     MAGNESIUM           Routine             2018  



                                         5:59 AM CST  

 

                                        



Results for this procedure are in the results section.



                     BASIC METABOLIC PANEL (7)     Routine             2018  



                                         5:59 AM CST  

 

                                        



Results for this procedure are in the results section.



                     CBC W/PLT COUNT & AUTO     Routine             2018  



                           DIFFERENTIAL              5:59 AM CST  

 

                                        



Results for this procedure are in the results section.



                     POCT-GLUCOSE METER     Routine             2018  



                                         5:15 AM CST  

 

                                        



Results for this procedure are in the results section.



                     POCT-GLUCOSE METER     Routine             2018  



                                         10:43 PM CST  

 

                                        



Results for this procedure are in the results section.



                     POCT-GLUCOSE METER     Routine             2018  



                                         4:03 PM CST  

 

                                        



Results for this procedure are in the results section.



                     POCT-GLUCOSE METER     Routine             2018  



                                         1:38 PM CST  

 

                                        



Results for this procedure are in the results section.



                     POCT-GLUCOSE METER     Routine             2018  



                                         8:20 AM CST  

 

                                        



Results for this procedure are in the results section.



                     CBC W/PLT COUNT & AUTO     Routine             2018  



                           DIFFERENTIAL              2:14 AM CST  

 

                                        



Results for this procedure are in the results section.



                     PHOSPHORUS          Routine             2018  



                                         2:14 AM CST  

 

                                        



Results for this procedure are in the results section.



                     MAGNESIUM           Routine             2018  



                                         2:14 AM CST  

 

                                        



Results for this procedure are in the results section.



                     BASIC METABOLIC PANEL (7)     Routine             2018  



                                         2:14 AM CST  

 

                                        



Results for this procedure are in the results section.



                     CBC W/PLT COUNT & AUTO     Routine             2018  



                           DIFFERENTIAL              2:14 AM CST  

 

                                        



Results for this procedure are in the results section.



                     POCT-GLUCOSE METER     Routine             12/10/2018  



                                         10:44 PM CST  

 

                                        



Results for this procedure are in the results section.



                     XR CHEST 1 VIEW     ASAP                12/10/2018  



                           PORTABLE/BEDSIDE          5:30 PM CST  

 

                                        



Results for this procedure are in the results section.



                     POCT-GLUCOSE METER     Routine             12/10/2018  



                                         3:56 PM CST  

 

                                        



Results for this procedure are in the results section.



                     BLOOD CULTURE       Routine             12/10/2018  



                                         12:14 PM CST  

 

                                        



Results for this procedure are in the results section.



                     BLOOD CULTURE       Routine             12/10/2018  



                                         12:09 PM CST  

 

                                        



Results for this procedure are in the results section.



                     POCT-GLUCOSE METER     Routine             12/10/2018  



                                         11:36 AM CST  

 

                                        



Results for this procedure are in the results section.



                     MAGNESIUM           Routine             12/10/2018  



                                         8:03 AM CST  

 

                                        



Results for this procedure are in the results section.



                     PHOSPHORUS          Routine             12/10/2018  



                                         8:03 AM CST  

 

                                        



Results for this procedure are in the results section.



                     BASIC METABOLIC PANEL (7)     Routine             12/10/2018  



                                         8:03 AM CST  

 

                                        



Results for this procedure are in the results section.



                     POCT-GLUCOSE METER     Routine             12/10/2018  



                                         5:29 AM CST  

 

                                        



Results for this procedure are in the results section.



                     CBC W/PLT COUNT & AUTO     Routine             12/10/2018  



                           DIFFERENTIAL              2:16 AM CST  

 

                                        



Results for this procedure are in the results section.



                     CBC W/PLT COUNT & AUTO     Routine             12/10/2018  



                           DIFFERENTIAL              2:16 AM CST  

 

                                        



Results for this procedure are in the results section.



                     POCT-GLUCOSE METER     Routine             2018  



                                         10:22 PM CST  

 

                                        



Results for this procedure are in the results section.



                     POCT-GLUCOSE METER     Routine             2018  



                                         5:31 PM CST  

 

                                        



Results for this procedure are in the results section.



                     POCT-GLUCOSE METER     Routine             2018  



                                         11:40 AM CST  

 

                                        



Results for this procedure are in the results section.



                     POCT-GLUCOSE METER     Routine             2018  



                                         5:29 AM CST  

 

                                        



Results for this procedure are in the results section.



                     (CELLAVISION MANUAL DIFF)     Routine             2018  



                                         5:05 AM CST  

 

                                        



Results for this procedure are in the results section.



                     CBC W/PLT COUNT & AUTO     Routine             2018  



                           DIFFERENTIAL              5:05 AM CST  

 

                                        



Results for this procedure are in the results section.



                     CBC W/PLT COUNT & AUTO     Routine             2018  



                           DIFFERENTIAL              5:05 AM CST  

 

                                        



Results for this procedure are in the results section.



                     PHOSPHORUS          Routine             2018  



                                         5:05 AM CST  

 

                                        



Results for this procedure are in the results section.



                     MAGNESIUM           Routine             2018  



                                         5:05 AM CST  

 

                                        



Results for this procedure are in the results section.



                     CALCIUM, IONIZED     Routine             2018  



                                         5:05 AM CST  

 

                                        



Results for this procedure are in the results section.



                     BASIC METABOLIC PANEL (7)     Routine             2018  



                                         5:05 AM CST  

 

                                        



Results for this procedure are in the results section.



                     POCT-GLUCOSE METER     Routine             2018  



                                         10:24 PM CST  

 

                                        



Results for this procedure are in the results section.



                     POCT-GLUCOSE METER     Routine             2018  



                                         5:18 PM CST  

 

                                        



Results for this procedure are in the results section.



                     POCT-GLUCOSE METER     Routine             2018  



                                         11:50 AM CST  

 

                                        



Results for this procedure are in the results section.



                     XR ABDOMEN 1 VIEW     STAT                2018  



                                         11:10 AM CST  

 

                                        



Results for this procedure are in the results section.



                     POCT-GLUCOSE METER     Routine             2018  



                                         6:22 AM CST  

 

                                        



Results for this procedure are in the results section.



                     TRIGLYCERIDES       Routine             2018  



                                         5:06 AM CST  

 

                                        



Results for this procedure are in the results section.



                     PHOSPHORUS          Routine             2018  



                                         5:06 AM CST  

 

                                        



Results for this procedure are in the results section.



                     MAGNESIUM           Routine             2018  



                                         5:06 AM CST  

 

                                        



Results for this procedure are in the results section.



                     CALCIUM, IONIZED     Routine             2018  



                                         5:06 AM CST  

 

                                        



Results for this procedure are in the results section.



                     BASIC METABOLIC PANEL (7)     Routine             2018  



                                         5:06 AM CST  

 

                                        



Results for this procedure are in the results section.



                     POCT-GLUCOSE METER     Routine             2018  



                                         12:23 AM CST  

 

                                         



                           TRANSFUSION SERVICE       2018  



                           REPORT - SCAN             6:00 PM CST  

 

                                        



Results for this procedure are in the results section.



                     XR CHEST 1 VIEW     STAT                2018  



                           PORTABLE/BEDSIDE          11:00 AM CST  

 

                                        



Results for this procedure are in the results section.



                     (CELLAVISION MANUAL DIFF)     Routine             2018  



                                         5:41 AM CST  

 

                                        



Results for this procedure are in the results section.



                     CBC W/PLT COUNT & AUTO     Routine             2018  



                           DIFFERENTIAL              5:41 AM CST  

 

                                        



Results for this procedure are in the results section.



                     PHOSPHORUS          Routine             2018  



                                         5:41 AM CST  

 

                                        



Results for this procedure are in the results section.



                     MAGNESIUM           Routine             2018  



                                         5:41 AM CST  

 

                                        



Results for this procedure are in the results section.



                     CALCIUM, IONIZED     Routine             2018  



                                         5:41 AM CST  

 

                                        



Results for this procedure are in the results section.



                     BASIC METABOLIC PANEL (7)     Routine             2018  



                                         5:41 AM CST  

 

                                        



Results for this procedure are in the results section.



                     CBC W/PLT COUNT & AUTO     Routine             2018  



                           DIFFERENTIAL              5:41 AM CST  

 

                                         



                     LAPAROTOMY,EXPLORATORY      2018          Small bowel obstruction 



                           6:55 PM CST               (HCC) 

 

                                        



Results for this procedure are in the results section.



                     PT/APTT             Routine             2018  



                                         7:38 AM CST  

 

                                        



Results for this procedure are in the results section.



                     PROTHROMBIN TIME/INR     Routine             2018  



                                         7:38 AM CST  

 

                                        



Results for this procedure are in the results section.



                     TYPE AND SCREEN,     Routine             2018  



                           AUTOMATED                 7:00 AM CST  

 

                                        



Results for this procedure are in the results section.



                     CBC W/PLT COUNT & AUTO     Routine             2018  



                           DIFFERENTIAL              3:56 AM CST  

 

                                        



Results for this procedure are in the results section.



                     CBC W/PLT COUNT & AUTO     Routine             2018  



                           DIFFERENTIAL              3:56 AM CST  

 

                                        



Results for this procedure are in the results section.



                     BASIC METABOLIC PANEL (7)     Routine             2018  



                                         3:56 AM CST  

 

                                        



Results for this procedure are in the results section.



                     CBC W/PLT COUNT & AUTO     Routine             2018  



                           DIFFERENTIAL              4:54 AM CST  

 

                                        



Results for this procedure are in the results section.



                     CBC W/PLT COUNT & AUTO     Routine             2018  



                           DIFFERENTIAL              4:54 AM CST  

 

                                        



Results for this procedure are in the results section.



                     BASIC METABOLIC PANEL (7)     Routine             2018  



                                         4:54 AM CST  

 

                                        



Results for this procedure are in the results section.



                     CBC W/PLT COUNT & AUTO     Routine             2018  



                           DIFFERENTIAL              3:59 AM CST  

 

                                        



Results for this procedure are in the results section.



                     COMPREHENSIVE METABOLIC     Routine             2018  



                           PANEL                     3:59 AM CST  

 

                                        



Results for this procedure are in the results section.



                     CBC W/PLT COUNT & AUTO     Routine             2018  



                           DIFFERENTIAL              3:59 AM CST  

 

                                        



Results for this procedure are in the results section.



                     XR ABDOMEN 1 VIEW     STAT                2018  



                                         4:20 PM CST  

 

                                        



Results for this procedure are in the results section.



                     CBC W/PLT COUNT & AUTO     Routine             2018  



                           DIFFERENTIAL              5:23 AM CST  

 

                                        



Results for this procedure are in the results section.



                     COMPREHENSIVE METABOLIC     Routine             2018  



                           PANEL                     5:23 AM CST  

 

                                        



Results for this procedure are in the results section.



                     CBC W/PLT COUNT & AUTO     Routine             2018  



                           DIFFERENTIAL              5:23 AM CST  

 

                                        



Results for this procedure are in the results section.



                     XR ABDOMEN 1 VIEW     STAT                2018  



                                         4:40 PM CST  

 

                                        



Results for this procedure are in the results section.



                     CBC W/PLT COUNT & AUTO     Routine             2018  



                           DIFFERENTIAL              4:29 AM CST  

 

                                        



Results for this procedure are in the results section.



                     COMPREHENSIVE METABOLIC     Routine             2018  



                           PANEL                     4:29 AM CST  

 

                                        



Results for this procedure are in the results section.



                     CBC W/PLT COUNT & AUTO     Routine             2018  



                           DIFFERENTIAL              4:29 AM CST  

 

                                        



Results for this procedure are in the results section.



                     URINALYSIS W/ REFLEX     Routine             2018  



                           URINE CULTURE             4:03 AM CST  

 

                                        



Results for this procedure are in the results section.



                     BLOOD CULTURE       Routine             2018  



                                         11:46 PM CST  

 

                                        



Results for this procedure are in the results section.



                     BLOOD CULTURE       Routine             2018  



                                         11:35 PM CST  

 

                                        



Results for this procedure are in the results section.



                     CBC W/PLT COUNT & AUTO     Routine             2018  



                           DIFFERENTIAL              11:34 PM CST  

 

                                        



Results for this procedure are in the results section.



                     LIPASE              Routine             2018  



                                         11:34 PM CST  

 

                                        



Results for this procedure are in the results section.



                     LACTIC ACID, VENOUS     Routine             2018  



                                         11:34 PM CST  

 

                                        



Results for this procedure are in the results section.



                     CBC W/PLT COUNT & AUTO     Routine             2018  



                           DIFFERENTIAL              11:34 PM CST  

 

                                        



Results for this procedure are in the results section.



                     COMPREHENSIVE METABOLIC     Routine             2018  



                           PANEL                     11:34 PM CST  



after 2018



Results

* CBC with platelet count + automated diff (2018  4:47 AM CST)



Only the most recent of 16 results within the time period is included.





   



                 Component       Value           Ref Range       Performed At

 

   



                 WBC             6.0             3.5 - 10.5 K/L     [facility]

 

   



                 RBC             2.54 (L)        3.93 - 5.22 M/L     [facility]

 

   



                 Hemoglobin      7.4 (L)         11.2 - 15.7 GM/DL     [facility]

 

   



                 Hematocrit      23.5 (L)        34.1 - 44.9 %     [facility]

 

   



                 MCV             92.5            79.4 - 94.8 fL     [facility]

 

   



                 MCH             29.1            25.6 - 32.2 pg     [facility]

 

   



                 MCHC            31.5 (L)        32.2 - 35.5 GM/DL     [facility]

 

   



                 RDW             15.4 (H)        11.7 - 14.4 %     [facility]

 

   



                 Platelets       541 (H)         150 - 450 K/CU MM     [facility]

 

   



                 MPV             9.6             9.4 - 12.3 fL     [facility]

 

   



                 nRBC            0               0 - 0 /100 WBC     [facility]

 

   



                 % Neutros       57              %               [facility]

 

   



                 % Lymphs        31              %               [facility]

 

   



                 % Monos         8               %               [facility]

 

   



                 % Eos           3               %               [facility]

 

   



                 % Baso          1               %               [facility]

 

   



                 # Neutros       3.38            1.56 - 6.13 K/L     [facility]

 

   



                 # Lymphs        1.87            1.18 - 3.74 K/L     [facility]

 

   



                 # Monos         0.45 (H)        0.24 - 0.36 K/L     [facility]

 

   



                 # Eos           0.19            0.04 - 0.36 K/L     [facility]

 

   



                 # Baso          0.03            0.01 - 0.08 K/L     [facility]

 

   



                 Immature        1               0 - 1 %         [facility]



                           Granulocytes-[facility]













                                         Specimen

 





                                         Blood









   



                 Performing Organization     Address         City/State/Zipcode     Phone Number

 

   



                 [place] [address]     [phone]





                                         [facility]   





* Phosphorus (2018  3:51 AM CST)



Only the most recent of 10 results within the time period is included.





   



                 Component       Value           Ref Range       Performed At

 

   



                 Phosphorus      4.2             2.3 - 4.7 mg/dL     [facility]













                                         Specimen

 





                                         Blood









   



                 Performing Organization     Address         City/State/Zipcode     Phone Number

 

   



                 [address] [address]     [phone]





                                         MEDICAL [facility]ENTER   





* Magnesium (2018  3:51 AM CST)



Only the most recent of 10 results within the time period is included.





   



                 Component       Value           Ref Range       Performed At

 

   



                 Magnesium       1.8             1.6 - 2.6 mg/dL     [facility]













                                         Specimen

 





                                         Blood









   



                 Performing Organization     Address         City/[facility]/Zipcode     Phone Number

 

   



                 [place] [address]     [phone]





                                         MEDICAL CENTER   





* Basic Metabolic Panel (2018  3:51 AM CST)



Only the most recent of 12 results within the time period is included.





   



                 Component       Value           Ref Range       Performed At

 

   



                 Sodium          137             136 - 145 meq/L     [facility]

 

   



                 Potassium       3.7             3.5 - 5.1 meq/L     [facility]

 

   



                 Chloride        103             98 - 107 meq/L     [facility]

 

   



                 CO2             25              22 - 29 meq/L     [facility]

 

   



                 BUN             14              7 - 21 mg/dL     [facility]

 

   



                 Creatinine      0.67            0.57 - 1.25 mg/dL     [facility]

 

   



                 Glucose         101             70 - 105 mg/dL     [facility]

 

   



                 Calcium         8.4             8.4 - 10.2 mg/dL     [facility]

 

   



                 EGFR            94Comment: ESTIMATED GFR IS     mL/min/1.73 sq m     [facility]



                           NOT AS ACCURATE AS CREATININE      [facility]



                                         CLEARANCE IN PREDICTING  



                                         GLOMERULAR FILTRATION RATE.  



                                         ESTIMATED GFR IS NOT  



                                         APPLICABLE FOR DIALYSIS  



                                         PATIENTS.  













                                         Specimen

 





                                         Blood









   



                 Performing Organization     Address         City/State/Zipcode     Phone Number

 

   



                 [facility]     [address] [place] [address] [phone]





                                         MEDICAL CENTER   





* CBC (Hemogram only) (2018  3:27 PM CST)





   



                 Component       Value           Ref Range       Performed At

 

   



                 WBC             9.7             3.5 - 10.5 K/L     [facility]

 

   



                 RBC             2.79 (L)        3.93 - 5.22 M/L     [facility]

 

   



                 Hemoglobin      8.0 (L)         11.2 - 15.7 GM/DL     [facility]

 

   



                 Hematocrit      25.5 (L)        34.1 - 44.9 %     [facility]

 

   



                 MCV             91.4            79.4 - 94.8 fL     [facility]

 

   



                 MCH             28.7            25.6 - 32.2 pg     [facility]

 

   



                 MCHC            31.4 (L)        32.2 - 35.5 GM/DL     [facility]

 

   



                 RDW             15.2 (H)        11.7 - 14.4 %     [facility]

 

   



                 Platelets       538 (H)         150 - 450 K/CU MM     [facility]

 

   



                 MPV             9.5             9.4 - 12.3 fL     [facility]

 

   



                 nRBC            0               0 - 0 /100 WBC     [facility]













                                         Specimen

 





                                         Blood - Central Venous



                                         Line









   



                 Performing Organization     Address         City[facility]     Phone Number

 

   



                 [address] [address]     [phone] [place] [address]   





* Reticulocyte count (2018  4:10 AM CST)





   



                 Component       Value           Ref Range       Performed At

 

   



                 % Retic         3.9 (H)         0.5 - 1.7 %     [facility]













                                         Specimen

 





                                         Blood - Central Venous



                                         Line









   



                 Performing Organization     Address         City/[facility]     Phone Number

 

   



                 [place] [address]     [phone] [place] [address]   





* POC-Glucose meter (12/15/2018  5:42 PM CST)



Only the most recent of 31 results within the time period is included.





   



                 Component       Value           Ref Range       Performed At

 

   



                 POC-Glucose Meter     164 (H)Comment: TESTED AT     70 - 110 mg/dL     [address]



                                         [id number]  













                                         Specimen

 





                                         Blood









   



                 Performing Organization     Address         City/[facility]     Phone Number

 

   



                 [place] [address]     [phone] [place] [address]   





* Clostridium difficile GDH Toxin (12/15/2018  2:12 PM CST)





   



                 Component       Value           Ref Range       Performed At

 

   



                 C. Difficle Toxin     Negative        Negative        [facility]

 

   



                 C. Difficile GDH Antigen     NegativeComment: No indication     Negative        [facility]



                           of Clostridium difficile      [facility]



                                         infection and no colonization.  



                                         Discontinue enteric isolation  



                                         and therapy.  













                                         Specimen

 





                                         Stool - Stool









 



                           Narrative                 Performed At

 

 



                           Testing performed by [employer] Rapid Cassette Assay.For GDH, published     [facility]



                                         sensitivity of the assay is 98.7% compared to cytotoxicity testing.For Toxin

                                         [facility]



                                         AB, published sensitivity is 87.8% and specificity 99.4% compared to 



                                         cytotoxicity testing. 



                                         Verification of kit performance was done by the [facility] prior to

 



                                         clinical use. 









   



                 Performing Organization     Address         City/State/Zipcode     Phone Number

 

   



                 [address] [address]     [phone] [place] [address]   





* Vitamin B12 and Folate (12/15/2018  1:12 PM CST)





   



                 Component       Value           Ref Range       Performed At

 

   



                 Vitamin B12     481             213 - 816 pg/mL     [facility]

 

   



                 Folate          14.1            >=7.0 ng/mL     [facility]













                                         Specimen

 





                                         Blood - Central Venous



                                         Line









   



                 Performing Organization     Address         City/[facility]     Phone Number

 

   



                 [address] [address]     [phone]





                                         MEDICAL CENTER   





* Iron, TIBC, % sat. (without ferritin) (12/15/2018  1:12 PM CST)





   



                 Component       Value           Ref Range       Performed At

 

   



                 Iron            20.0 (L)        40.0 - 160.0 ug/dL     [facility]

 

   



                 TIBC            299             250 - 450 ug/dL     [facility]

 

   



                 Iron % Saturation     7 (L)           20 - 55 %       [facility]













                                         Specimen

 





                                         Blood - Central Venous



                                         Line









   



                 Performing Organization     Address         City/[facility]     Phone Number

 

   



                 [address] [address]     [phone] [place] [address]   





* Hemoglobin and hematocrit (12/15/2018  1:12 PM CST)





   



                 Component       Value           Ref Range       Performed At

 

   



                 Hemoglobin      8.4 (L)         11.2 - 15.7 GM/DL     [facility]

 

   



                 Hematocrit      26.1 (L)        34.1 - 44.9 %     [facility]













                                         Specimen

 





                                         Blood - Central Venous



                                         Line









   



                 Performing Organization     Address         City/[facility]/Zipcode     Phone Number

 

   



                 [address] [address]     [phone] [place] [address]   





* Ferritin (12/15/2018  1:12 PM CST)





   



                 Component       Value           Ref Range       Performed At

 

   



                 Ferritin        91              5 - 275 ng/mL     [facility]













                                         Specimen

 





                                         Blood - Central Venous



                                         Line









   



                 Performing Organization     Address         City/[facility]/Zipcode     Phone Number

 

   



                 [address] [address]     [phone]





                                         [facility]   





* XR chest 1 view portable / bedside (2018  5:54 PM CST)



Only the most recent of 3 results within the time period is included.





 



                           Narrative                 Performed At

 

 



                           FINAL REPORT              [facility]



                                         PATIENT ID: [id number] 



                                         Chest one view AP 2018 8:53 PM 



                                         CLINICAL INDICATION: chest pain/pressure, SOB 



                                         COMPARISON: 12/10/2018 



                                         IMPRESSION: 



                                         There is bibasilar linear atelectasis. Cardiomediastinal contours are 



                                         stable. The central pulmonary vasculature is not engorged. Support 



                                         hardware is unchanged in position. 



                                         Signed: Seipel, Timothy MD 



                                         Report Verified Date/Time:2018 20:53:18 



                                         Reading Location: [facility] Reading Room 



                                         Electronically signed by: TIMOTHY J SEIPEL, M.D. on 2018 08:53 PM

 









                                        Procedure Note

 

                                        



Interface, External Ris In - 2018  8:55 PM CST



FINAL REPORT

 

PATIENT ID:   [id number]

 

Chest one view AP 2018 8:53 PM

 

CLINICAL INDICATION: chest pain/pressure, SOB

 

COMPARISON: 12/10/2018

 

IMPRESSION:

 

There is bibasilar linear atelectasis. Cardiomediastinal contours are 

stable. The central pulmonary vasculature is not engorged. Support 

hardware is unchanged in position.

 

Signed: Seipel, Timothy MD

Report Verified Date/Time:  2018 20:53:18

 

Reading Location: [facility] Reading Room

    Electronically signed by: TIMOTHY J SEIPEL, M.D. on 2018 08:53 PM

 









   



                 Performing Organization     Address         City/State/Zipcode     Phone Number

 

   



                                         [facility]   





* ECG 12 lead (2018  4:33 PM CST)





 



                           Narrative                 Performed At

 

 



                           Ventricular Rate 97 BPM     [facility]



                                         Atrial Rate 97 BPM 



                                         P-R Interval 138 ms 



                                         QRS Duration 62 ms 



                                         Q-T Interval 356 ms 



                                         QTC Calculation(Bazett) 452 ms 



                                         P Axis 55 degrees 



                                         R Axis 22 degrees 



                                         T Axis 28 degrees 



                                         Normal sinus rhythm 



                                         Poor R wave progression 



                                         Low voltage QRS 



                                         Otherwise normal ECG 



                                         No previous ECGs available 



                                         Confirmed by WILLOW HITCHCOCK MICHAEL ([id number]) on 2018 7:37:44 AM 









                                        Procedure Note

 

                                        



Interface, External Ris In - 2018  7:37 AM CST



Ventricular Rate 97 BPM

Atrial Rate 97 BPM

P-R Interval 138 ms

QRS Duration 62 ms

Q-T Interval 356 ms

QTC Calculation(Bazett) 452 ms

P Axis 55 degrees

R Axis 22 degrees

T Axis 28 degrees



Normal sinus rhythm

Poor R wave progression

Low voltage QRS

Otherwise normal ECG

No previous ECGs available

Confirmed by WILLOW HITCHCOCK MICHAEL ([id number]) on 2018 7:37:44 AM









   



                 Performing Organization     Address         City/State/Zipcode     Phone Number

 

   



                                         [facility]   





* CT abdomen/pelvis with IV contrast (2018  4:13 PM CST)





 



                           Narrative                 Performed At

 

 



                           FINAL REPORT              [facility]



                                         PATIENT ID: [id number] 



                                         CT scan of the abdomen and pelvis: 



                                         CLINICAL HISTORY: Status post exploratory laparotomy 2018 with 



                                         lysis of effusions. Postoperative leukocytosis and lack of bowel 



                                         function. Evaluate for abscess/obstruction. 



                                         Comparison exam: CT scan of the abdomen and pelvis 2018 



                                         TECHNIQUE: CT scan of the abdomen and pelvis with intravenous 



                                         contrast. Dose modulation, iterative reconstruction, and/or weight 



                                         based adjustment of the mA/kV was utilized to reduce the radiation 



                                         dose to as low as reasonably achievable. 



                                         FINDINGS: 



                                         Right lower lobe atelectasis. Normal heart. 



                                         Normal liver, spleen, and pancreas. Previous cholecystectomy. 



                                         Small hiatus hernia. Postsurgical changes to the stomach consistent 



                                         with prior gastric sleeve. Mild gaseous and fluid distention of the 



                                         small bowel. No transition zone is identified to suggest a small 



                                         bowel obstruction. 



                                         Small locule of free intraperitoneal air consistent with the recent 



                                         operative status. No mesenteric or retroperitoneal lymphadenopathy. 



                                         Normal caliber aorta. Patent mesenteric arteries. Normal adrenal 



                                         glands. Subcentimeter right renal hypodensity, too small to 



                                         accurately characterize. 



                                         Previous hysterectomy. No adnexal abnormalities. Normal bladder. 



                                         No acute skeletal abnormalities. Spinal infusion catheter extends 



                                         into the thoracic spine central canal. Remote postsurgical changes in 



                                         the lumbar spine. Recent midline ventral abdominal wall incision with 



                                         overlying surgical skin staples. 



                                         IMPRESSION: Mild gaseous and fluid distention of the small bowel. No 



                                         transition zone is identified to suggest a small bowel obstruction. 



                                         Mild postoperative ileus is favored. 



                                         Signed: Kvng Fernandez MD 



                                         Report Verified Date/Time:2018 21:08:04 



                                         Reading Location: [address] Reading Room 



                                         Electronically signed by: KVNG FERNANDEZ M.D. on 2018 09:08 PM

 









                                        Procedure Note

 

                                        



Interface, External Ris In - 2018  9:10 PM CST



FINAL REPORT

 

PATIENT ID:   [id number]

 

CT scan of the abdomen and pelvis:

 

CLINICAL HISTORY: Status post exploratory laparotomy 2018 with 

lysis of effusions. Postoperative leukocytosis and lack of bowel 

function. Evaluate for abscess/obstruction.

 

Comparison exam: CT scan of the abdomen and pelvis 2018

 

TECHNIQUE: CT scan of the abdomen and pelvis with intravenous 

contrast. Dose modulation, iterative reconstruction, and/or weight 

based adjustment of the mA/kV was utilized to reduce the radiation 

dose to as low as reasonably achievable.

 

FINDINGS:

 

Right lower lobe atelectasis. Normal heart.

 

Normal liver, spleen, and pancreas. Previous cholecystectomy.

 

Small hiatus hernia. Postsurgical changes to the stomach consistent 

with prior gastric sleeve. Mild gaseous and fluid distention of the 

small bowel. No transition zone is identified to suggest a small 

bowel obstruction.

 

Small locule of free intraperitoneal air consistent with the recent 

operative status. No mesenteric or retroperitoneal lymphadenopathy. 

Normal caliber aorta. Patent mesenteric arteries. Normal adrenal 

glands. Subcentimeter right renal hypodensity, too small to 

accurately characterize.

 

Previous hysterectomy. No adnexal abnormalities. Normal bladder.

 

No acute skeletal abnormalities. Spinal infusion catheter extends 

into the thoracic spine central canal. Remote postsurgical changes in 

the lumbar spine. Recent midline ventral abdominal wall incision with 

overlying surgical skin staples.

 

IMPRESSION: Mild gaseous and fluid distention of the small bowel. No 

transition zone is identified to suggest a small bowel obstruction. 

Mild postoperative ileus is favored.

 

Signed: Kvng Fernandez MD

Report Verified Date/Time:  2018 21:08:04

 

Reading Location: [address] Reading Room

      Electronically signed by: KVNG FERNANDEZ M.D. on 2018 09:08 PM

 









   



                 Performing Organization     Address         City/State/Zipcode     Phone Number

 

   



                                         [facility]   





* CT abdomen/pelvis without iv contrast (2018  1:27 PM CST)





 



                           Narrative                 Performed At

 

 



                           FINAL REPORT              [facility]



                                         PATIENT ID: [id number] 



                                         TECHNIQUE: CT of the abdomen and pelvis WITHOUT intravenous contrast 



                                         and WITH oral contrast. Dose modulation, iterative reconstruction, 



                                         and/or weight-based adjustment of the mA/kV was utilized to reduce 



                                         the radiation dose to as low as reasonably achievable. 



                                         INDICATION: Leukocytosis, bowel obstruction. 



                                         COMPARISON: Radiograph from 12/3/2018. 



                                         FINDINGS: 



                                         ABSENCE OF INTRAVENOUS CONTRAST DECREASES SENSITIVITY FOR DETECTION 



                                         OF FOCAL LESIONS AND VASCULAR PATHOLOGY. 



                                         LOWER THORAX: NG tube with tip in the second portion the duodenum. 



                                         HEPATOBILIARY: No focal hepatic lesions. Gallbladder is unremarkable. 



                                         No biliary ductal dilatation. 



                                         SPLEEN: No splenomegaly. 



                                         PANCREAS: No focal masses or ductal dilatation. 



                                         ADRENALS: No adrenal nodules. 



                                         KIDNEYS/URETERS: No hydronephrosis, stones, or solid mass lesions. 



                                         PELVIC ORGANS/BLADDER: Prior hysterectomy with a normal appearance of 



                                         the ovaries. 



                                         PERITONEUM/RETROPERITONEUM: No free air or fluid. 



                                         LYMPH NODES: No lymphadenopathy. 



                                         VESSELS: Unremarkable. 



                                         GI TRACT: The bowel is diffusely dilated with a transition point in 



                                         the right lower quadrant as seen on coronal image 37. The distal 



                                         bowel is completely decompressed. The lower esophagus is diffusely 



                                         thickened. 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 



                                         BONES AND SOFT TISSUES: Other external device with tip in the lower 



                                         thoracic spinal canal. Prior disc fusions at L4-L5 and L5-S1 with L4 



                                         and L5 laminectomies. 



                                         IMPRESSION: 



                                         1.Complete small bowel obstruction with a transition in the right 



                                         lower quadrant. 



                                         2.NG tube with tip in the second portion of the duodenum. 



                                         3.The diffuse thickening of the lower esophagus is indeterminate but 



                                         could be due to esophagitis. 



                                         Signed: Jose Howe MD 



                                         Report Verified Date/Time:2018 16:26:06 



                                         Reading Location: [address] Body Reading Room 



                                         Electronically signed by: JOSE HOWE MD on 2018 04:26 PM

 









                                        Procedure Note

 

                                        



Interface, External Ris In - 2018  1:28 PM CST



FINAL REPORT

 

PATIENT ID:   [id number]

 

TECHNIQUE: CT of the abdomen and pelvis WITHOUT intravenous contrast 

and WITH oral contrast. Dose modulation, iterative reconstruction, 

and/or weight-based adjustment of the mA/kV was utilized to reduce 

the radiation dose to as low as reasonably achievable.

 

INDICATION: Leukocytosis, bowel obstruction.

 

COMPARISON: Radiograph from 12/3/2018.

 

 

FINDINGS:

 

ABSENCE OF INTRAVENOUS CONTRAST DECREASES SENSITIVITY FOR DETECTION 

OF FOCAL LESIONS AND VASCULAR PATHOLOGY.

 

LOWER THORAX: NG tube with tip in the second portion the duodenum.

 

HEPATOBILIARY: No focal hepatic lesions. Gallbladder is unremarkable. 

No biliary ductal dilatation.

SPLEEN: No splenomegaly.

PANCREAS: No focal masses or ductal dilatation.

 

ADRENALS: No adrenal nodules.

KIDNEYS/URETERS: No hydronephrosis, stones, or solid mass lesions.

PELVIC ORGANS/BLADDER: Prior hysterectomy with a normal appearance of 

the ovaries.

 

PERITONEUM/RETROPERITONEUM: No free air or fluid.

LYMPH NODES: No lymphadenopathy.

VESSELS: Unremarkable.

 

GI TRACT: The bowel is diffusely dilated with a transition point in 

the right lower quadrant as seen on coronal image 37. The distal 

bowel is completely decompressed. The lower esophagus is diffusely 

thickened. 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

 

BONES AND SOFT TISSUES: Other external device with tip in the lower 

thoracic spinal canal. Prior disc fusions at L4-L5 and L5-S1 with L4 

and L5 laminectomies.

 

 

IMPRESSION:

 

                                        1.Complete small bowel obstruction with a transition in the right 

lower quadrant.

 

                                        2.NG tube with tip in the second portion of the duodenum.

 

                                        3.The diffuse thickening of the lower esophagus is indeterminate but 

could be due to esophagitis.

 

Signed: Jose Howe MD

Report Verified Date/Time:  2018 16:26:06

 

Reading Location: [facility] B1 C013Y CT Body Reading Room

      Electronically signed by: JOSE HOWE MD on 2018 04:26 PM

 









   



                 Performing Organization     Address         City/State/Zipcode     Phone Number

 

   



                                          [facility]   





* Blood culture (12/10/2018 12:14 PM CST)



Only the most recent of 4 results within the time period is included.





   



                 Component       Value           Ref Range       Performed At

 

   



                     Result              No growth in 5 days      [facility]













                                         Specimen

 





                                         Blood - Central Venous



                                         Line









   



                 Performing Organization     Address         City/State/Zipcode     Phone Number

 

   



                 [place] [id number] [place] [address]     [phone]





                                         [facility]   





* Manual Differential (2018  5:05 AM CST)



Only the most recent of 2 results within the time period is included.





   



                 Component       Value           Ref Range       Performed At

 

   



                 % Neutros       77              %               [facility]

 

   



                 % Lymphs        13              %               [facility]

 

   



                 % Monos         7               %               [facility]

 

   



                 % Eos           1               %               [facility]

 

   



                 % Myelo         1 (H)           0 - 0 %         [facility]

 

   



                 % Atypical Lymphs     1 (H)           0 - 0 %         [facility]

 

   



                 # Neutros       11.78 (H)       1.56 - 6.13 K/ul     [facility]

 

   



                 # Lymphs        1.99            1.18 - 3.74 K/ul     [facility]

 

   



                 # Monos         1.07 (H)        0.24 - 0.36 K/uL     [facility]

 

   



                 # Eos           0.15            0.04 - 0.36 K/uL     [facility]

 

   



                 # Myelo         0.15 (H)        0.00 - 0.00 K/uL     [facility]

 

   



                 # Atypical Lymphs     0.15 (H)        0.00 - 0.00 K/uL     [facility]

 

   



                     Total Counted       100                 [facility]

 

   



                     RBC Morphology      Normal              [facility]

 

   



                     Smudge Cells        Present             [facility]

 

   



                     Giant Platelet      Present             [facility]

 

   



                     Platelet Conc       Adequate            [facility]













                                         Specimen

 





                                         Blood - Central Venous



                                         Line









 



                           Narrative                 Performed At

 

 



                           Received comment:         [facility]



                           User comments:            [facility]



                                         Slide comments: 









   



                 Performing Organization     Address         City/[facility]/Zipcode     Phone Number

 

   



                 [facility]     [address] [place] [address] [phone]





                                         MEDICAL CENTER   





* Calcium, Ionized (2018  5:05 AM CST)



Only the most recent of 3 results within the time period is included.





   



                 Component       Value           Ref Range       Performed At

 

   



                 Calcium, Ion     0.94 (L)        1.12 - 1.27 mmol/L     [facility]

 

   



                     pH, Blood           7.50                [facility]













                                         Specimen

 





                                         Blood - Central Venous



                                         Line









   



                 Performing Organization     Address         City[facility]     Phone Number

 

   



                 [facility]     [address] [place] [address] [phone]





                                         [facility]   





* XR abdomen / KUB 1 view (2018 11:10 AM CST)



Only the most recent of 3 results within the time period is included.





 



                           Narrative                 Performed At

 

 



                           FINAL REPORT              [facility]



                                         PATIENT ID: [id number] 



                                         Comparison: 12/3/2018 



                                         TECHNIQUE: Frontal image of the abdomen 



                                         FINDINGS: 



                                         Mildly dilated gas-filled loops of small bowel are seen, although 



                                         overall degree of dilatation has decreased from before. This may 



                                         represent mild ileus. Surgical staples project over the midline. No 



                                         gross free intraperitoneal air but evaluation is limited due to 



                                         technique and positioning. 



                                         Tip of nasogastric tube projects in the distal stomach. Right-sided 



                                         pain pump noted. 



                                         Signed: Prasanna Edwards MD 



                                         Report Verified Date/Time:2018 13:25:58 



                                         Reading Location: [address] Transitional Reading Room 



                                         Electronically signed by: PRASANNA EDWARDS M.D. on 2018 01:25 PM

 









                                        Procedure Note

 

                                        



Interface, External Ris In - 2018  1:28 PM CST



FINAL REPORT

 

PATIENT ID:   [id number]

 

Comparison: 12/3/2018

 

TECHNIQUE: Frontal image of the abdomen

 

FINDINGS:

 

Mildly dilated gas-filled loops of small bowel are seen, although 

overall degree of dilatation has decreased from before. This may 

represent mild ileus. Surgical staples project over the midline. No 

gross free intraperitoneal air but evaluation is limited due to 

technique and positioning.

 

Tip of nasogastric tube projects in the distal stomach. Right-sided 

pain pump noted.

 

Signed: Prasanna Edwards MD

Report Verified Date/Time:  2018 13:25:58

 

Reading Location: [address] Transitional Reading Room

      Electronically signed by: PRASANNA EDWARDS M.D. on 2018 01:25 PM

 









   



                 Performing Organization     Address         City/[facility]     Phone Number

 

   



                                          [facility]   





* Triglycerides (2018  5:06 AM CST)





   



                 Component       Value           Ref Range       Performed At

 

   



                 Triglycerides     72              mg/dL           [facility]













                                         Specimen

 





                                         Blood









 



                           Narrative                 Performed At

 

 



                           TRIGLYCERIDE REFERENCE RANGE     [facility]



                           Low Risk<150          [facility]



                                         Borderline Risk 150-199 



                                         High [id number] 



                                         Very High Risk >=500 









   



                 Performing Organization     Address         City/[facility]/Zipcode     Phone Number

 

   



                 [facility]     [address] [place] [address]     [phone]





                                         MEDICAL CENTER   





* TRANSFUSION SERVICE REPORT - SCAN (2018  6:00 PM CST)





 



                           Narrative                 Performed At

 

 



                                         This result has an attachment that is not available. 





* PT/aPTT (2018  7:38 AM CST)





   



                 Component       Value           Ref Range       Performed At

 

   



                 Protime         15.4 (H)        11.7 - 14.7 seconds     [facility]

 

   



                 INR             1.2             <=5.9           [facility]

 

   



                 PTT             32.0            22.5 - 36.0 seconds     [facility]













                                         Specimen

 





                                         Blood - Arm, Right









 



                           Narrative                 Performed At

 

 



                           RECOMMENDED COUMADIN/WARFARIN INR THERAPY RANGES     [facility]



                           STANDARD DOSE: 2.0 - 3.0 Includes: PROPHYLAXIS for venous thrombosis,     [facility]



                                         systemic embolization; TREATMENT for venous thrombosis and/or pulmonary embolus.

 



                                         HIGH RISK: Target INR is 2.5-3.5 for patients with mechanical heart valves. 









   



                 Performing Organization     Address         City/[facility]/Zipcode     Phone Number

 

   



                 [place] [address]     [phone] [place] [address]   





* Prothrombin time/INR (2018  7:38 AM CST)





   



                 Component       Value           Ref Range       Performed At

 

   



                 Protime         15.4 (H)        11.7 - 14.7 seconds     [facility]

 

   



                 INR             1.2             <=5.9           [facility]













                                         Specimen

 





                                         Blood - Arm, Right









 



                           Narrative                 Performed At

 

 



                           RECOMMENDED COUMADIN/WARFARIN INR THERAPY RANGES     [facility]



                           STANDARD DOSE: 2.0 - 3.0 Includes: PROPHYLAXIS for venous thrombosis,     [facility]



                                         systemic embolization; TREATMENT for venous thrombosis and/or pulmonary embolus.

 



                                         HIGH RISK: Target INR is 2.5-3.5 for patients with mechanical heart valves. 









   



                 Performing Organization     Address         City/[facility]     Phone Number

 

   



                 [place] [id number]   





* Type and screen, automated (2018  7:00 AM CST)





   



                 Component       Value           Ref Range       Performed At

 

   



                     ABO/RH AUTOMATED (BEAKER)     A POSITIVE          [facility]

 

   



                     Ab Scrn             NEGATIVE            [facility]













                                         Specimen

 





                                         Blood









   



                 Performing Organization     Address         City/[facility]/Zipcode     Phone Number

 

   



                 [place] [address]     [phone]   





* Comprehensive metabolic panel (2018  3:59 AM CST)



Only the most recent of 4 results within the time period is included.





   



                 Component       Value           Ref Range       Performed At

 

   



                 Protein, Total     6.2             6.0 - 8.3 gm/dL     [facility]

 

   



                 Albumin         3.7             3.5 - 5.0 g/dL     [facility]

 

   



                 Alkaline Phosphatase     77              40 - 150 U/L     [facility]

 

   



                 Total Bilirubin     0.3             0.2 - 1.2 mg/dL     [facility]

 

   



                 Sodium          141             136 - 145 meq/L     [facility]

 

   



                 Potassium       3.2 (L)         3.5 - 5.1 meq/L     [facility]

 

   



                 Chloride        108 (H)         98 - 107 meq/L     [facility]

 

   



                 CO2             24              22 - 29 meq/L     [facility]

 

   



                 BUN             14              7 - 21 mg/dL     [facility]

 

   



                 Creatinine      0.90            0.57 - 1.25 mg/dL     [facility]

 

   



                 Glucose         111 (H)         70 - 105 mg/dL     [facility]

 

   



                 Calcium         9.0             8.4 - 10.2 mg/dL     [facility]

 

   



                 AST             13              5 - 34 U/L      [facility]

 

   



                 ALT             8               6 - 55 U/L      [facility]

 

   



                 EGFR            67Comment: ESTIMATED GFR IS     mL/min/1.73 sq m     [facility]



                           NOT AS ACCURATE AS CREATININE      [facility]



                                         CLEARANCE IN PREDICTING  



                                         GLOMERULAR FILTRATION RATE.  



                                         ESTIMATED GFR IS NOT  



                                         APPLICABLE FOR DIALYSIS  



                                         PATIENTS.  













                                         Specimen

 





                                         Blood - Arm, Right









   



                 Performing Organization     Address         City/State/Zipcode     Phone Number

 

   



                 [facility]     [address] [place] [address] [phone]





                                         MEDICAL CENTER   





* Urinalysis w/Microscopic + Reflex to Culture (2018  4:03 AM CST)





   



                 Component       Value           Ref Range       Performed At

 

   



                     Color, UA           Yellow              [facility]

 

   



                     Clarity, UA         Hazy                [facility]

 

   



                 Specific Gravity, UA     1.049 (H)       1.001 - 1.035     [facility]

 

   



                 pH, UA          6.0             5.0 - 8.0       [facility]

 

   



                 Protein, UA     50 mg/dL (A)     Negative        [facility]

 

   



                 Glucose, UA     Negative        Negative        [facility]

 

   



                 Ketones, UA     80 mg/dL (A)     Negative        [facility]

 

   



                 Bilirubin, UA     Negative        Negative        [facility]

 

   



                 Blood, UA       Negative        Negative        [facility]

 

   



                 Nitrite, UA     Negative        Negative        [facility]

 

   



                 Leukocytes, UA     Negative        Negative        [facility]

 

   



                 Urobilinogen, UA     0.2             0.2 - 1.0 mg/dL     [facility]

 

   



                 RBC, UA         1               /HPF            [facility]

 

   



                 WBC, UA         7               /HPF            [facility]

 

   



                 Squam Epithel, UA     11              /HPF            [facility]

 

   



                           Specimen Source           [facility]













                                         Specimen

 





                                         Urine - Urine, Clean



                                         Catch









   



                 Performing Organization     Address         City/[facility]     Phone Number

 

   



                 [address]   





* Lactic acid, venous, whole blood (2018 11:34 PM CST)





   



                 Component       Value           Ref Range       Performed At

 

   



                 Lactate, Venous     1.1             0.5 - 2.2 mmol/L     [facility]













                                         Specimen

 





                                         Blood









   



                 Performing Organization     Address         City[facility]     Phone Number

 

   



                 [address]   





* Lipase (2018 11:34 PM CST)





   



                 Component       Value           Ref Range       Performed At

 

   



                 Lipase          5 (L)           8 - 78 U/L      [facility]













                                         Specimen

 





                                         Blood









   



                 Performing Organization     Address         City[facility]     Phone Number

 

   



                 [address]   





after 2018



Insurance







     



            Payer      Benefit     Subscriber ID     Type       Phone      Address



                                         Plan /    



                                         Group    

 

     



                     [facility]              xxxxxxxxx   



                           MEDICARE D CARE         MEDICARE    



                                         [facility]    

 

     



                 MEDICAID        MEDICAID        xxxxxxxxx       [place]    









     



            Guarantor Name     Account     Relation to     Date of     Phone      Billing Address



                     Type                Patient             Birth  

 

     



            Darrell,Domitila     Personal/F     Self       1970     [phone]      [address] 



                     amily               (Home)              [place] [address]







Advance Directives





For more information, please contact:



[facility]



[address] [place] [address] [phone]









                          Date Inactivated          Comments



                           Code Status               Date Activated  

 

                                                     



                           Full Code                 2018 10:23 PM  









  



                           This code status was determined by:     Patient

## 2019-03-13 NOTE — NUR
PRIOR TO LEAVING FOR CT PT. STATED, "Are they going to give me some dilaudid 
before i leave.?" it was explaind to the pt that her bp is too low at this 
time.